# Patient Record
Sex: FEMALE | Race: WHITE | NOT HISPANIC OR LATINO | Employment: OTHER | RURAL
[De-identification: names, ages, dates, MRNs, and addresses within clinical notes are randomized per-mention and may not be internally consistent; named-entity substitution may affect disease eponyms.]

---

## 2020-11-23 ENCOUNTER — HISTORICAL (OUTPATIENT)
Dept: ADMINISTRATIVE | Facility: HOSPITAL | Age: 50
End: 2020-11-23

## 2021-04-17 ENCOUNTER — HOSPITAL ENCOUNTER (EMERGENCY)
Facility: HOSPITAL | Age: 51
Discharge: HOME | End: 2021-04-17
Payer: MEDICARE

## 2021-04-17 VITALS
RESPIRATION RATE: 16 BRPM | TEMPERATURE: 98 F | OXYGEN SATURATION: 96 % | BODY MASS INDEX: 27.31 KG/M2 | HEART RATE: 90 BPM | HEIGHT: 64 IN | SYSTOLIC BLOOD PRESSURE: 129 MMHG | WEIGHT: 160 LBS | DIASTOLIC BLOOD PRESSURE: 90 MMHG

## 2021-04-17 DIAGNOSIS — R10.9 ABDOMINAL PAIN, UNSPECIFIED ABDOMINAL LOCATION: ICD-10-CM

## 2021-04-17 DIAGNOSIS — R25.1 TREMOR: ICD-10-CM

## 2021-04-17 DIAGNOSIS — R63.0 DECREASED APPETITE: ICD-10-CM

## 2021-04-17 DIAGNOSIS — K59.00 CONSTIPATION, UNSPECIFIED CONSTIPATION TYPE: Primary | ICD-10-CM

## 2021-04-17 DIAGNOSIS — F20.9 SCHIZOPHRENIA: ICD-10-CM

## 2021-04-17 DIAGNOSIS — K59.00 CONSTIPATION: ICD-10-CM

## 2021-04-17 LAB
ALBUMIN SERPL BCP-MCNC: 4 G/DL (ref 3.5–5)
ALBUMIN/GLOB SERPL: 1.1 {RATIO}
ALP SERPL-CCNC: 109 U/L (ref 41–108)
ALT SERPL W P-5'-P-CCNC: 32 U/L (ref 13–56)
ANION GAP SERPL CALCULATED.3IONS-SCNC: 13 MMOL/L (ref 7–16)
AST SERPL W P-5'-P-CCNC: 26 U/L (ref 15–37)
BASOPHILS # BLD AUTO: 0.02 K/UL (ref 0–0.2)
BASOPHILS NFR BLD AUTO: 0.5 % (ref 0–1)
BILIRUB SERPL-MCNC: 0.3 MG/DL (ref 0–1.2)
BILIRUB UR QL STRIP: NEGATIVE
BUN SERPL-MCNC: 7 MG/DL (ref 7–18)
BUN/CREAT SERPL: 8 (ref 6–20)
CALCIUM SERPL-MCNC: 9.1 MG/DL (ref 8.5–10.1)
CHLORIDE SERPL-SCNC: 107 MMOL/L (ref 98–107)
CLARITY UR: CLEAR
CO2 SERPL-SCNC: 29 MMOL/L (ref 21–32)
COLOR UR: ABNORMAL
CREAT SERPL-MCNC: 0.92 MG/DL (ref 0.55–1.02)
DIFFERENTIAL METHOD BLD: ABNORMAL
EOSINOPHIL # BLD AUTO: 0.12 K/UL (ref 0–0.5)
EOSINOPHIL NFR BLD AUTO: 3 % (ref 1–4)
ERYTHROCYTE [DISTWIDTH] IN BLOOD BY AUTOMATED COUNT: 13.2 % (ref 11.5–14.5)
GLOBULIN SER-MCNC: 3.6 G/DL (ref 2–4)
GLUCOSE SERPL-MCNC: 91 MG/DL (ref 74–106)
GLUCOSE UR STRIP-MCNC: NEGATIVE MG/DL
HCT VFR BLD AUTO: 46.8 % (ref 38–47)
HGB BLD-MCNC: 15.3 G/DL (ref 12–16)
IMM GRANULOCYTES # BLD AUTO: 0.01 K/UL (ref 0–0.04)
IMM GRANULOCYTES NFR BLD: 0.2 % (ref 0–0.4)
KETONES UR STRIP-SCNC: 15 MG/DL
LEUKOCYTE ESTERASE UR QL STRIP: NEGATIVE
LIPASE SERPL-CCNC: 84 U/L (ref 73–393)
LYMPHOCYTES # BLD AUTO: 0.85 K/UL (ref 1–4.8)
LYMPHOCYTES NFR BLD AUTO: 21 % (ref 27–41)
MCH RBC QN AUTO: 30.1 PG (ref 27–31)
MCHC RBC AUTO-ENTMCNC: 32.7 G/DL (ref 32–36)
MCV RBC AUTO: 92.1 FL (ref 80–96)
MONOCYTES # BLD AUTO: 0.61 K/UL (ref 0–0.8)
MONOCYTES NFR BLD AUTO: 15.1 % (ref 2–6)
MPC BLD CALC-MCNC: 10.1 FL (ref 9.4–12.4)
NEUTROPHILS # BLD AUTO: 2.44 K/UL (ref 1.8–7.7)
NEUTROPHILS NFR BLD AUTO: 60.2 % (ref 53–65)
NITRITE UR QL STRIP: NEGATIVE
NRBC # BLD AUTO: 0 X10E3/UL
NRBC, AUTO (.00): 0 %
PH UR STRIP: 6 PH UNITS
PLATELET # BLD AUTO: 246 K/UL (ref 150–400)
POTASSIUM SERPL-SCNC: 3.8 MMOL/L (ref 3.5–5.1)
PROT SERPL-MCNC: 7.6 G/DL (ref 6.4–8.2)
PROT UR QL STRIP: NEGATIVE
RBC # BLD AUTO: 5.08 M/UL (ref 4.2–5.4)
RBC # UR STRIP: NEGATIVE /UL
SODIUM SERPL-SCNC: 145 MMOL/L (ref 136–145)
SP GR UR STRIP: 1.02
UROBILINOGEN UR STRIP-ACNC: 0.2 MG/DL
WBC # BLD AUTO: 4.05 K/UL (ref 4.5–11)

## 2021-04-17 PROCEDURE — 99283 EMERGENCY DEPT VISIT LOW MDM: CPT | Mod: ,,, | Performed by: NURSE PRACTITIONER

## 2021-04-17 PROCEDURE — 99284 EMERGENCY DEPT VISIT MOD MDM: CPT | Mod: 25

## 2021-04-17 PROCEDURE — 81003 URINALYSIS AUTO W/O SCOPE: CPT | Performed by: NURSE PRACTITIONER

## 2021-04-17 PROCEDURE — 80053 COMPREHEN METABOLIC PANEL: CPT | Performed by: NURSE PRACTITIONER

## 2021-04-17 PROCEDURE — 99283 PR EMERGENCY DEPT VISIT,LEVEL III: ICD-10-PCS | Mod: ,,, | Performed by: NURSE PRACTITIONER

## 2021-04-17 PROCEDURE — 85025 COMPLETE CBC W/AUTO DIFF WBC: CPT | Performed by: NURSE PRACTITIONER

## 2021-04-17 PROCEDURE — 83690 ASSAY OF LIPASE: CPT | Performed by: NURSE PRACTITIONER

## 2021-04-17 PROCEDURE — 36415 COLL VENOUS BLD VENIPUNCTURE: CPT | Performed by: NURSE PRACTITIONER

## 2021-04-17 RX ORDER — TRAZODONE HYDROCHLORIDE 150 MG/1
150 TABLET ORAL NIGHTLY
COMMUNITY
End: 2021-06-15

## 2021-04-17 RX ORDER — HYDROXYZINE HYDROCHLORIDE 25 MG/1
25 TABLET, FILM COATED ORAL 3 TIMES DAILY
COMMUNITY
End: 2021-06-15

## 2021-04-17 RX ORDER — NITROFURANTOIN (MACROCRYSTALS) 100 MG/1
100 CAPSULE ORAL EVERY 6 HOURS
COMMUNITY
End: 2021-06-15

## 2021-04-17 RX ORDER — BENZTROPINE MESYLATE 1 MG/1
1 TABLET ORAL 2 TIMES DAILY
COMMUNITY
End: 2021-06-15

## 2021-04-17 RX ORDER — HALOPERIDOL DECANOATE 100 MG/ML
INJECTION INTRAMUSCULAR
COMMUNITY
End: 2021-06-15

## 2021-04-17 RX ORDER — OLANZAPINE 5 MG/1
5 TABLET ORAL NIGHTLY
COMMUNITY
End: 2021-06-15

## 2021-04-17 RX ORDER — POLYETHYLENE GLYCOL 3350 17 G/17G
17 POWDER, FOR SOLUTION ORAL DAILY
Qty: 1 BOTTLE | Refills: 0 | Status: SHIPPED | OUTPATIENT
Start: 2021-04-17 | End: 2021-06-15

## 2021-04-17 RX ORDER — DULOXETIN HYDROCHLORIDE 60 MG/1
60 CAPSULE, DELAYED RELEASE ORAL DAILY
COMMUNITY
End: 2021-06-15

## 2021-04-26 ENCOUNTER — HISTORICAL (OUTPATIENT)
Dept: ADMINISTRATIVE | Facility: HOSPITAL | Age: 51
End: 2021-04-26

## 2021-06-27 ENCOUNTER — HOSPITAL ENCOUNTER (EMERGENCY)
Facility: HOSPITAL | Age: 51
Discharge: HOME | End: 2021-06-27
Payer: MEDICARE

## 2021-06-27 VITALS
HEART RATE: 87 BPM | RESPIRATION RATE: 20 BRPM | DIASTOLIC BLOOD PRESSURE: 95 MMHG | OXYGEN SATURATION: 94 % | WEIGHT: 160 LBS | BODY MASS INDEX: 28.35 KG/M2 | SYSTOLIC BLOOD PRESSURE: 134 MMHG | TEMPERATURE: 99 F | HEIGHT: 63 IN

## 2021-06-27 DIAGNOSIS — R33.9 URINARY RETENTION: ICD-10-CM

## 2021-06-27 DIAGNOSIS — F20.9 SCHIZOPHRENIA: ICD-10-CM

## 2021-06-27 DIAGNOSIS — R25.1 TREMOR: ICD-10-CM

## 2021-06-27 DIAGNOSIS — F20.9 SCHIZOPHRENIA, UNSPECIFIED TYPE: Primary | ICD-10-CM

## 2021-06-27 DIAGNOSIS — K59.00 CONSTIPATION: ICD-10-CM

## 2021-06-27 LAB
AMPHET UR QL SCN: NEGATIVE
ANION GAP SERPL CALCULATED.3IONS-SCNC: 17 MMOL/L (ref 7–16)
BARBITURATES UR QL SCN: NEGATIVE
BASOPHILS # BLD AUTO: 0.03 K/UL (ref 0–0.2)
BASOPHILS NFR BLD AUTO: 0.4 % (ref 0–1)
BENZODIAZ METAB UR QL SCN: NEGATIVE
BILIRUB UR QL STRIP: NEGATIVE
BUN SERPL-MCNC: 10 MG/DL (ref 7–18)
BUN/CREAT SERPL: 12 (ref 6–20)
CALCIUM SERPL-MCNC: 9.3 MG/DL (ref 8.5–10.1)
CANNABINOIDS UR QL SCN: NEGATIVE
CHLORIDE SERPL-SCNC: 105 MMOL/L (ref 98–107)
CLARITY UR: CLEAR
CO2 SERPL-SCNC: 25 MMOL/L (ref 21–32)
COCAINE UR QL SCN: NEGATIVE
COLOR UR: ABNORMAL
CREAT SERPL-MCNC: 0.81 MG/DL (ref 0.55–1.02)
DIFFERENTIAL METHOD BLD: ABNORMAL
EOSINOPHIL # BLD AUTO: 0.07 K/UL (ref 0–0.5)
EOSINOPHIL NFR BLD AUTO: 1 % (ref 1–4)
ERYTHROCYTE [DISTWIDTH] IN BLOOD BY AUTOMATED COUNT: 14.3 % (ref 11.5–14.5)
ETHANOL, BLOOD (CATEGORY): NOT DETECTED
GLUCOSE SERPL-MCNC: 210 MG/DL (ref 74–106)
GLUCOSE UR STRIP-MCNC: 250 MG/DL
HCT VFR BLD AUTO: 42.7 % (ref 38–47)
HGB BLD-MCNC: 14.1 G/DL (ref 12–16)
IMM GRANULOCYTES # BLD AUTO: 0.05 K/UL (ref 0–0.04)
IMM GRANULOCYTES NFR BLD: 0.7 % (ref 0–0.4)
KETONES UR STRIP-SCNC: NEGATIVE MG/DL
LEUKOCYTE ESTERASE UR QL STRIP: NEGATIVE
LYMPHOCYTES # BLD AUTO: 1.08 K/UL (ref 1–4.8)
LYMPHOCYTES NFR BLD AUTO: 15.8 % (ref 27–41)
MCH RBC QN AUTO: 30.2 PG (ref 27–31)
MCHC RBC AUTO-ENTMCNC: 33 G/DL (ref 32–36)
MCV RBC AUTO: 91.4 FL (ref 80–96)
MONOCYTES # BLD AUTO: 0.45 K/UL (ref 0–0.8)
MONOCYTES NFR BLD AUTO: 6.6 % (ref 2–6)
MPC BLD CALC-MCNC: 10.5 FL (ref 9.4–12.4)
NEUTROPHILS # BLD AUTO: 5.16 K/UL (ref 1.8–7.7)
NEUTROPHILS NFR BLD AUTO: 75.5 % (ref 53–65)
NITRITE UR QL STRIP: NEGATIVE
NRBC # BLD AUTO: 0 X10E3/UL
NRBC, AUTO (.00): 0 %
OPIATES UR QL SCN: NEGATIVE
PCP UR QL SCN: NEGATIVE
PH UR STRIP: 7 PH UNITS
PLATELET # BLD AUTO: 230 K/UL (ref 150–400)
POTASSIUM SERPL-SCNC: 3.5 MMOL/L (ref 3.5–5.1)
PROT UR QL STRIP: NEGATIVE
RBC # BLD AUTO: 4.67 M/UL (ref 4.2–5.4)
RBC # UR STRIP: NEGATIVE /UL
SODIUM SERPL-SCNC: 143 MMOL/L (ref 136–145)
SP GR UR STRIP: 1.01
UROBILINOGEN UR STRIP-ACNC: 0.2 MG/DL
WBC # BLD AUTO: 6.84 K/UL (ref 4.5–11)

## 2021-06-27 PROCEDURE — 99284 EMERGENCY DEPT VISIT MOD MDM: CPT | Mod: 25

## 2021-06-27 PROCEDURE — 80048 BASIC METABOLIC PNL TOTAL CA: CPT | Performed by: NURSE PRACTITIONER

## 2021-06-27 PROCEDURE — 80307 DRUG TEST PRSMV CHEM ANLYZR: CPT | Mod: 59 | Performed by: NURSE PRACTITIONER

## 2021-06-27 PROCEDURE — 96374 THER/PROPH/DIAG INJ IV PUSH: CPT

## 2021-06-27 PROCEDURE — 99283 PR EMERGENCY DEPT VISIT,LEVEL III: ICD-10-PCS | Mod: ,,, | Performed by: NURSE PRACTITIONER

## 2021-06-27 PROCEDURE — 81003 URINALYSIS AUTO W/O SCOPE: CPT | Mod: 59 | Performed by: NURSE PRACTITIONER

## 2021-06-27 PROCEDURE — 63600175 PHARM REV CODE 636 W HCPCS: Performed by: NURSE PRACTITIONER

## 2021-06-27 PROCEDURE — 99283 EMERGENCY DEPT VISIT LOW MDM: CPT | Mod: ,,, | Performed by: NURSE PRACTITIONER

## 2021-06-27 PROCEDURE — 80307 DRUG TEST PRSMV CHEM ANLYZR: CPT | Performed by: NURSE PRACTITIONER

## 2021-06-27 PROCEDURE — 36415 COLL VENOUS BLD VENIPUNCTURE: CPT | Performed by: NURSE PRACTITIONER

## 2021-06-27 PROCEDURE — 85025 COMPLETE CBC W/AUTO DIFF WBC: CPT | Performed by: NURSE PRACTITIONER

## 2021-06-27 RX ORDER — LORAZEPAM 2 MG/ML
1 INJECTION INTRAMUSCULAR
Status: COMPLETED | OUTPATIENT
Start: 2021-06-27 | End: 2021-06-27

## 2021-06-27 RX ADMIN — LORAZEPAM 1 MG: 2 INJECTION INTRAMUSCULAR; INTRAVENOUS at 02:06

## 2021-07-12 PROBLEM — R33.9 URINARY RETENTION: Status: ACTIVE | Noted: 2021-07-12

## 2021-08-03 ENCOUNTER — HOSPITAL ENCOUNTER (OUTPATIENT)
Dept: RADIOLOGY | Facility: HOSPITAL | Age: 51
Discharge: HOME | End: 2021-08-03
Payer: MEDICARE

## 2021-08-03 DIAGNOSIS — E04.9 ENLARGEMENT OF THYROID: ICD-10-CM

## 2021-08-03 PROCEDURE — 76536 US EXAM OF HEAD AND NECK: CPT | Mod: TC

## 2021-09-16 ENCOUNTER — OFFICE VISIT (OUTPATIENT)
Dept: FAMILY MEDICINE | Facility: CLINIC | Age: 51
End: 2021-09-16
Payer: MEDICARE

## 2021-09-16 ENCOUNTER — OFFICE VISIT (OUTPATIENT)
Dept: OBSTETRICS AND GYNECOLOGY | Facility: CLINIC | Age: 51
End: 2021-09-16
Payer: MEDICARE

## 2021-09-16 VITALS
WEIGHT: 152 LBS | BODY MASS INDEX: 28.7 KG/M2 | DIASTOLIC BLOOD PRESSURE: 73 MMHG | TEMPERATURE: 97 F | RESPIRATION RATE: 18 BRPM | HEIGHT: 61 IN | SYSTOLIC BLOOD PRESSURE: 105 MMHG | HEART RATE: 85 BPM | OXYGEN SATURATION: 96 %

## 2021-09-16 VITALS
SYSTOLIC BLOOD PRESSURE: 122 MMHG | WEIGHT: 150.38 LBS | HEIGHT: 61 IN | BODY MASS INDEX: 28.39 KG/M2 | DIASTOLIC BLOOD PRESSURE: 64 MMHG

## 2021-09-16 DIAGNOSIS — G89.29 CHRONIC RIGHT SHOULDER PAIN: Chronic | ICD-10-CM

## 2021-09-16 DIAGNOSIS — R25.1 TREMOR: Chronic | ICD-10-CM

## 2021-09-16 DIAGNOSIS — R30.0 DYSURIA: Primary | ICD-10-CM

## 2021-09-16 DIAGNOSIS — K59.00 CONSTIPATION, UNSPECIFIED CONSTIPATION TYPE: Chronic | ICD-10-CM

## 2021-09-16 DIAGNOSIS — R13.10 DYSPHAGIA, UNSPECIFIED TYPE: Primary | Chronic | ICD-10-CM

## 2021-09-16 DIAGNOSIS — Z12.11 COLON CANCER SCREENING: ICD-10-CM

## 2021-09-16 DIAGNOSIS — M25.511 CHRONIC RIGHT SHOULDER PAIN: Chronic | ICD-10-CM

## 2021-09-16 DIAGNOSIS — F20.9 SCHIZOPHRENIA, UNSPECIFIED TYPE: Chronic | ICD-10-CM

## 2021-09-16 LAB
BILIRUB UR QL STRIP: NEGATIVE
CLARITY UR: CLEAR
COLOR UR: YELLOW
GLUCOSE UR STRIP-MCNC: NEGATIVE MG/DL
KETONES UR STRIP-SCNC: NEGATIVE MG/DL
LEUKOCYTE ESTERASE UR QL STRIP: NEGATIVE
NITRITE UR QL STRIP: NEGATIVE
PH UR STRIP: 5 PH UNITS
PROT UR QL STRIP: NEGATIVE
RBC # UR STRIP: NEGATIVE /UL
RBC #/AREA URNS HPF: NORMAL /HPF
SP GR UR STRIP: 1.02
SQUAMOUS #/AREA URNS LPF: NORMAL /LPF
UROBILINOGEN UR STRIP-ACNC: 0.2 MG/DL
WBC #/AREA URNS HPF: NORMAL /HPF

## 2021-09-16 PROCEDURE — 99213 OFFICE O/P EST LOW 20 MIN: CPT | Mod: PBBFAC | Performed by: OBSTETRICS & GYNECOLOGY

## 2021-09-16 PROCEDURE — 81001 URINALYSIS: ICD-10-PCS | Mod: ,,, | Performed by: CLINICAL MEDICAL LABORATORY

## 2021-09-16 PROCEDURE — 99204 PR OFFICE/OUTPT VISIT, NEW, LEVL IV, 45-59 MIN: ICD-10-PCS | Mod: ,,, | Performed by: FAMILY MEDICINE

## 2021-09-16 PROCEDURE — 99203 PR OFFICE/OUTPT VISIT, NEW, LEVL III, 30-44 MIN: ICD-10-PCS | Mod: S$PBB,,, | Performed by: OBSTETRICS & GYNECOLOGY

## 2021-09-16 PROCEDURE — 99203 OFFICE O/P NEW LOW 30 MIN: CPT | Mod: S$PBB,,, | Performed by: OBSTETRICS & GYNECOLOGY

## 2021-09-16 PROCEDURE — 87086 URINE CULTURE/COLONY COUNT: CPT | Mod: ,,, | Performed by: CLINICAL MEDICAL LABORATORY

## 2021-09-16 PROCEDURE — 99204 OFFICE O/P NEW MOD 45 MIN: CPT | Mod: ,,, | Performed by: FAMILY MEDICINE

## 2021-09-16 PROCEDURE — 87086 CULTURE, URINE: ICD-10-PCS | Mod: ,,, | Performed by: CLINICAL MEDICAL LABORATORY

## 2021-09-16 PROCEDURE — 81001 URINALYSIS AUTO W/SCOPE: CPT | Mod: ,,, | Performed by: CLINICAL MEDICAL LABORATORY

## 2021-09-16 RX ORDER — QUETIAPINE 400 MG/1
400 TABLET, FILM COATED, EXTENDED RELEASE ORAL DAILY
COMMUNITY
End: 2022-07-19 | Stop reason: SDUPTHER

## 2021-09-16 RX ORDER — QUETIAPINE FUMARATE 200 MG/1
1 TABLET, FILM COATED ORAL NIGHTLY
COMMUNITY
Start: 2021-08-16 | End: 2021-11-04

## 2021-09-16 RX ORDER — QUETIAPINE FUMARATE 100 MG/1
1 TABLET, FILM COATED ORAL NIGHTLY
COMMUNITY
Start: 2021-08-10 | End: 2021-11-04

## 2021-09-16 RX ORDER — QUETIAPINE FUMARATE 300 MG/1
300 TABLET, FILM COATED ORAL NIGHTLY
COMMUNITY
Start: 2021-08-27 | End: 2021-11-04

## 2021-09-16 RX ORDER — DOCUSATE SODIUM 100 MG/1
200 CAPSULE ORAL DAILY
COMMUNITY
Start: 2021-08-24

## 2021-09-16 RX ORDER — BENZTROPINE MESYLATE 2 MG/1
1 TABLET ORAL 2 TIMES DAILY
COMMUNITY
End: 2021-11-04

## 2021-09-16 RX ORDER — LEVOTHYROXINE SODIUM 100 UG/1
100 TABLET ORAL
COMMUNITY
End: 2022-02-11 | Stop reason: SDUPTHER

## 2021-09-16 RX ORDER — HALOPERIDOL DECANOATE 100 MG/ML
100 INJECTION INTRAMUSCULAR
COMMUNITY
Start: 2021-09-14

## 2021-09-16 RX ORDER — TRAZODONE HYDROCHLORIDE 150 MG/1
1 TABLET ORAL NIGHTLY
COMMUNITY
Start: 2021-08-24 | End: 2021-11-04 | Stop reason: ALTCHOICE

## 2021-09-16 RX ORDER — ARIPIPRAZOLE 10 MG/1
10 TABLET ORAL EVERY MORNING
COMMUNITY
Start: 2021-08-27

## 2021-09-18 LAB — UA COMPLETE W REFLEX CULTURE PNL UR: NO GROWTH

## 2021-09-20 ENCOUNTER — OFFICE VISIT (OUTPATIENT)
Dept: FAMILY MEDICINE | Facility: CLINIC | Age: 51
End: 2021-09-20
Payer: MEDICARE

## 2021-09-20 VITALS
RESPIRATION RATE: 16 BRPM | TEMPERATURE: 98 F | HEIGHT: 63 IN | HEART RATE: 95 BPM | BODY MASS INDEX: 26.72 KG/M2 | DIASTOLIC BLOOD PRESSURE: 79 MMHG | OXYGEN SATURATION: 96 % | WEIGHT: 150.81 LBS | SYSTOLIC BLOOD PRESSURE: 109 MMHG

## 2021-09-20 DIAGNOSIS — M25.511 CHRONIC RIGHT SHOULDER PAIN: Chronic | ICD-10-CM

## 2021-09-20 DIAGNOSIS — D17.21 LIPOMA OF RIGHT UPPER EXTREMITY: Primary | Chronic | ICD-10-CM

## 2021-09-20 DIAGNOSIS — G89.29 CHRONIC RIGHT SHOULDER PAIN: Chronic | ICD-10-CM

## 2021-09-20 PROBLEM — F20.9 SCHIZOPHRENIA: Chronic | Status: ACTIVE | Noted: 2021-04-17

## 2021-09-20 PROBLEM — R13.10 DYSPHAGIA: Chronic | Status: ACTIVE | Noted: 2021-09-20

## 2021-09-20 PROBLEM — R25.1 TREMOR: Chronic | Status: ACTIVE | Noted: 2021-04-17

## 2021-09-20 PROBLEM — K59.00 CONSTIPATION: Chronic | Status: ACTIVE | Noted: 2021-04-17

## 2021-09-20 PROCEDURE — 99213 OFFICE O/P EST LOW 20 MIN: CPT | Mod: ,,, | Performed by: FAMILY MEDICINE

## 2021-09-20 PROCEDURE — 99213 PR OFFICE/OUTPT VISIT, EST, LEVL III, 20-29 MIN: ICD-10-PCS | Mod: ,,, | Performed by: FAMILY MEDICINE

## 2021-09-20 RX ORDER — NAPROXEN 500 MG/1
500 TABLET ORAL 2 TIMES DAILY
Qty: 60 TABLET | Refills: 1 | Status: SHIPPED | OUTPATIENT
Start: 2021-09-20 | End: 2021-11-04

## 2021-09-22 ENCOUNTER — TELEPHONE (OUTPATIENT)
Dept: FAMILY MEDICINE | Facility: CLINIC | Age: 51
End: 2021-09-22

## 2021-11-04 ENCOUNTER — OFFICE VISIT (OUTPATIENT)
Dept: FAMILY MEDICINE | Facility: CLINIC | Age: 51
End: 2021-11-04
Payer: MEDICARE

## 2021-11-04 VITALS
HEART RATE: 127 BPM | BODY MASS INDEX: 26.22 KG/M2 | RESPIRATION RATE: 18 BRPM | HEIGHT: 63 IN | WEIGHT: 148 LBS | DIASTOLIC BLOOD PRESSURE: 88 MMHG | TEMPERATURE: 97 F | SYSTOLIC BLOOD PRESSURE: 119 MMHG | OXYGEN SATURATION: 95 %

## 2021-11-04 DIAGNOSIS — F20.81 SCHIZOPHRENIFORM DISORDER: Chronic | ICD-10-CM

## 2021-11-04 PROCEDURE — 99214 OFFICE O/P EST MOD 30 MIN: CPT | Mod: ,,, | Performed by: FAMILY MEDICINE

## 2021-11-04 PROCEDURE — 99214 PR OFFICE/OUTPT VISIT, EST, LEVL IV, 30-39 MIN: ICD-10-PCS | Mod: ,,, | Performed by: FAMILY MEDICINE

## 2021-11-04 RX ORDER — MIRTAZAPINE 15 MG/1
7.5 TABLET, FILM COATED ORAL NIGHTLY
COMMUNITY

## 2021-11-04 RX ORDER — HYDROXYZINE PAMOATE 50 MG/1
50 CAPSULE ORAL NIGHTLY PRN
COMMUNITY

## 2021-11-30 ENCOUNTER — HOSPITAL ENCOUNTER (OUTPATIENT)
Dept: RADIOLOGY | Facility: HOSPITAL | Age: 51
Discharge: HOME | End: 2021-11-30
Payer: MEDICARE

## 2021-11-30 VITALS — BODY MASS INDEX: 26.22 KG/M2 | HEIGHT: 63 IN | WEIGHT: 148 LBS

## 2021-11-30 DIAGNOSIS — R13.10 DYSPHAGIA: Primary | ICD-10-CM

## 2021-11-30 DIAGNOSIS — Z12.11 SCREENING FOR MALIGNANT NEOPLASM OF COLON: Primary | ICD-10-CM

## 2021-11-30 DIAGNOSIS — Z12.31 VISIT FOR SCREENING MAMMOGRAM: ICD-10-CM

## 2021-11-30 DIAGNOSIS — Z01.818 PRE-OP TESTING: ICD-10-CM

## 2021-11-30 PROCEDURE — 77067 SCR MAMMO BI INCL CAD: CPT | Mod: TC

## 2021-12-20 ENCOUNTER — HOSPITAL ENCOUNTER (OUTPATIENT)
Dept: GASTROENTEROLOGY | Facility: HOSPITAL | Age: 51
Discharge: HOME | End: 2021-12-20
Attending: INTERNAL MEDICINE
Payer: MEDICARE

## 2021-12-20 ENCOUNTER — ANESTHESIA (OUTPATIENT)
Dept: GASTROENTEROLOGY | Facility: HOSPITAL | Age: 51
End: 2021-12-20
Payer: MEDICARE

## 2021-12-20 ENCOUNTER — ANESTHESIA EVENT (OUTPATIENT)
Dept: GASTROENTEROLOGY | Facility: HOSPITAL | Age: 51
End: 2021-12-20
Payer: MEDICARE

## 2021-12-20 VITALS
HEART RATE: 63 BPM | TEMPERATURE: 99 F | SYSTOLIC BLOOD PRESSURE: 123 MMHG | RESPIRATION RATE: 14 BRPM | DIASTOLIC BLOOD PRESSURE: 66 MMHG | OXYGEN SATURATION: 99 %

## 2021-12-20 DIAGNOSIS — K29.00 ACUTE SUPERFICIAL GASTRITIS WITHOUT HEMORRHAGE: ICD-10-CM

## 2021-12-20 DIAGNOSIS — R13.19 ESOPHAGEAL DYSPHAGIA: ICD-10-CM

## 2021-12-20 DIAGNOSIS — R13.10 DYSPHAGIA: ICD-10-CM

## 2021-12-20 PROCEDURE — D9220A PRA ANESTHESIA: Mod: ,,, | Performed by: NURSE ANESTHETIST, CERTIFIED REGISTERED

## 2021-12-20 PROCEDURE — 88305 TISSUE EXAM BY PATHOLOGIST: CPT | Mod: SUR,59 | Performed by: INTERNAL MEDICINE

## 2021-12-20 PROCEDURE — D9220A PRA ANESTHESIA: ICD-10-PCS | Mod: ,,, | Performed by: NURSE ANESTHETIST, CERTIFIED REGISTERED

## 2021-12-20 PROCEDURE — 25000003 PHARM REV CODE 250: Performed by: NURSE ANESTHETIST, CERTIFIED REGISTERED

## 2021-12-20 PROCEDURE — 88305 SURGICAL PATHOLOGY: ICD-10-PCS | Mod: 26,,, | Performed by: PATHOLOGY

## 2021-12-20 PROCEDURE — 27201423 OPTIME MED/SURG SUP & DEVICES STERILE SUPPLY

## 2021-12-20 PROCEDURE — 63600175 PHARM REV CODE 636 W HCPCS: Performed by: NURSE ANESTHETIST, CERTIFIED REGISTERED

## 2021-12-20 PROCEDURE — 43239 EGD BIOPSY SINGLE/MULTIPLE: CPT

## 2021-12-20 PROCEDURE — 43450 DILATE ESOPHAGUS 1/MULT PASS: CPT

## 2021-12-20 PROCEDURE — 88305 TISSUE EXAM BY PATHOLOGIST: CPT | Mod: 26,,, | Performed by: PATHOLOGY

## 2021-12-20 PROCEDURE — 88342 IMHCHEM/IMCYTCHM 1ST ANTB: CPT | Mod: 26,,, | Performed by: PATHOLOGY

## 2021-12-20 PROCEDURE — 88342 SURGICAL PATHOLOGY: ICD-10-PCS | Mod: 26,,, | Performed by: PATHOLOGY

## 2021-12-20 PROCEDURE — C1889 IMPLANT/INSERT DEVICE, NOC: HCPCS

## 2021-12-20 PROCEDURE — 37000008 HC ANESTHESIA 1ST 15 MINUTES

## 2021-12-20 RX ORDER — SODIUM CHLORIDE 0.9 % (FLUSH) 0.9 %
10 SYRINGE (ML) INJECTION
Status: DISCONTINUED | OUTPATIENT
Start: 2021-12-20 | End: 2021-12-21 | Stop reason: HOSPADM

## 2021-12-20 RX ORDER — FENTANYL CITRATE 50 UG/ML
INJECTION, SOLUTION INTRAMUSCULAR; INTRAVENOUS
Status: DISCONTINUED | OUTPATIENT
Start: 2021-12-20 | End: 2021-12-20

## 2021-12-20 RX ADMIN — FENTANYL CITRATE 100 MCG: 50 INJECTION INTRAMUSCULAR; INTRAVENOUS at 01:12

## 2021-12-20 RX ADMIN — SODIUM CHLORIDE: 9 INJECTION, SOLUTION INTRAVENOUS at 01:12

## 2021-12-21 ENCOUNTER — TELEPHONE (OUTPATIENT)
Dept: GASTROENTEROLOGY | Facility: CLINIC | Age: 51
End: 2021-12-21
Payer: MEDICAID

## 2021-12-21 LAB
ESTROGEN SERPL-MCNC: NORMAL PG/ML
LAB AP GROSS DESCRIPTION: NORMAL
LAB AP LABORATORY NOTES: NORMAL
T3RU NFR SERPL: NORMAL %

## 2022-01-04 ENCOUNTER — TELEPHONE (OUTPATIENT)
Dept: GASTROENTEROLOGY | Facility: CLINIC | Age: 52
End: 2022-01-04
Payer: MEDICARE

## 2022-01-04 NOTE — TELEPHONE ENCOUNTER
Called EGD path results . Patient verbalized understanding.      ----- Message from Cindy Francis sent at 1/4/2022  1:24 PM CST -----  Re: bx results

## 2022-01-05 DIAGNOSIS — Z01.818 PRE-OP TESTING: ICD-10-CM

## 2022-01-10 ENCOUNTER — ANESTHESIA (OUTPATIENT)
Dept: GASTROENTEROLOGY | Facility: HOSPITAL | Age: 52
End: 2022-01-10
Payer: MEDICAID

## 2022-01-10 ENCOUNTER — HOSPITAL ENCOUNTER (OUTPATIENT)
Dept: GASTROENTEROLOGY | Facility: HOSPITAL | Age: 52
Discharge: HOME | End: 2022-01-10
Attending: INTERNAL MEDICINE
Payer: MEDICARE

## 2022-01-10 ENCOUNTER — ANESTHESIA EVENT (OUTPATIENT)
Dept: GASTROENTEROLOGY | Facility: HOSPITAL | Age: 52
End: 2022-01-10
Payer: MEDICARE

## 2022-01-10 VITALS
TEMPERATURE: 97 F | HEART RATE: 74 BPM | SYSTOLIC BLOOD PRESSURE: 119 MMHG | RESPIRATION RATE: 12 BRPM | OXYGEN SATURATION: 99 % | DIASTOLIC BLOOD PRESSURE: 68 MMHG

## 2022-01-10 DIAGNOSIS — D12.2 ADENOMATOUS POLYP OF ASCENDING COLON: ICD-10-CM

## 2022-01-10 DIAGNOSIS — Z12.11 SCREENING FOR MALIGNANT NEOPLASM OF COLON: ICD-10-CM

## 2022-01-10 DIAGNOSIS — K57.30 DIVERTICULA, COLON: ICD-10-CM

## 2022-01-10 DIAGNOSIS — D12.5 ADENOMATOUS POLYP OF SIGMOID COLON: ICD-10-CM

## 2022-01-10 DIAGNOSIS — D12.4 ADENOMATOUS POLYP OF DESCENDING COLON: ICD-10-CM

## 2022-01-10 PROCEDURE — 88305 TISSUE EXAM BY PATHOLOGIST: CPT | Mod: 26,,, | Performed by: PATHOLOGY

## 2022-01-10 PROCEDURE — 88305 TISSUE EXAM BY PATHOLOGIST: CPT | Mod: SUR | Performed by: INTERNAL MEDICINE

## 2022-01-10 PROCEDURE — D9220A PRA ANESTHESIA: ICD-10-PCS | Mod: PT,,, | Performed by: NURSE ANESTHETIST, CERTIFIED REGISTERED

## 2022-01-10 PROCEDURE — 45380 COLONOSCOPY AND BIOPSY: CPT | Mod: 33

## 2022-01-10 PROCEDURE — 37000008 HC ANESTHESIA 1ST 15 MINUTES

## 2022-01-10 PROCEDURE — 25000003 PHARM REV CODE 250: Performed by: NURSE ANESTHETIST, CERTIFIED REGISTERED

## 2022-01-10 PROCEDURE — 88305 SURGICAL PATHOLOGY: ICD-10-PCS | Mod: 26,XU,, | Performed by: PATHOLOGY

## 2022-01-10 PROCEDURE — C1889 IMPLANT/INSERT DEVICE, NOC: HCPCS

## 2022-01-10 PROCEDURE — 63600175 PHARM REV CODE 636 W HCPCS: Performed by: NURSE ANESTHETIST, CERTIFIED REGISTERED

## 2022-01-10 PROCEDURE — D9220A PRA ANESTHESIA: Mod: PT,,, | Performed by: NURSE ANESTHETIST, CERTIFIED REGISTERED

## 2022-01-10 PROCEDURE — 37000009 HC ANESTHESIA EA ADD 15 MINS

## 2022-01-10 PROCEDURE — 27201423 OPTIME MED/SURG SUP & DEVICES STERILE SUPPLY

## 2022-01-10 RX ORDER — SODIUM CHLORIDE 0.9 % (FLUSH) 0.9 %
10 SYRINGE (ML) INJECTION
Status: DISCONTINUED | OUTPATIENT
Start: 2022-01-10 | End: 2022-01-11 | Stop reason: HOSPADM

## 2022-01-10 RX ORDER — SODIUM CHLORIDE 9 MG/ML
INJECTION, SOLUTION INTRAVENOUS CONTINUOUS PRN
Status: DISCONTINUED | OUTPATIENT
Start: 2022-01-10 | End: 2022-01-10

## 2022-01-10 RX ORDER — LIDOCAINE HYDROCHLORIDE 20 MG/ML
INJECTION, SOLUTION EPIDURAL; INFILTRATION; INTRACAUDAL; PERINEURAL
Status: DISCONTINUED | OUTPATIENT
Start: 2022-01-10 | End: 2022-01-10

## 2022-01-10 RX ORDER — PROPOFOL 10 MG/ML
VIAL (ML) INTRAVENOUS
Status: DISCONTINUED | OUTPATIENT
Start: 2022-01-10 | End: 2022-01-10

## 2022-01-10 RX ADMIN — PROPOFOL 150 MG: 10 INJECTION, EMULSION INTRAVENOUS at 12:01

## 2022-01-10 RX ADMIN — SODIUM CHLORIDE: 9 INJECTION, SOLUTION INTRAVENOUS at 12:01

## 2022-01-10 RX ADMIN — PROPOFOL 100 MG: 10 INJECTION, EMULSION INTRAVENOUS at 12:01

## 2022-01-10 RX ADMIN — PROPOFOL 75 MG: 10 INJECTION, EMULSION INTRAVENOUS at 12:01

## 2022-01-10 RX ADMIN — LIDOCAINE HYDROCHLORIDE 75 MG: 20 INJECTION, SOLUTION EPIDURAL; INFILTRATION; INTRACAUDAL; PERINEURAL at 12:01

## 2022-01-10 NOTE — TRANSFER OF CARE
Anesthesia Transfer of Care Note    Patient: Shruthi Mcdermott    Procedure(s) Performed: colonoscopy  Patient location: Other: [facility]    Anesthesia Type: general    Transport from OR: Transported from OR on room air with adequate spontaneous ventilation. Continuous SpO2 monitoring in transport. Continuous ECG monitoring in transport. Continuos invasive BP monitoring in transport    Post pain: adequate analgesia    Post assessment: no apparent anesthetic complications    Post vital signs: stable    Level of consciousness: sedated and responds to stimulation    Nausea/Vomiting: no nausea/vomiting    Complications: none    Transfer of care protocol was followedComments: Good SV continue, NAD noted, VSS, RTRN      Last vitals:   Visit Vitals  BP (!) 107/57 (BP Location: Right arm, Patient Position: Lying)   Pulse 78   Temp 36.1 °C (97 °F)   Resp 18   SpO2 97%   Breastfeeding No

## 2022-01-10 NOTE — ANESTHESIA PREPROCEDURE EVALUATION
01/10/2022  Shruthi Mcdermott is a 51 y.o., female.    Anesthesia Evaluation    I have reviewed the Patient Summary Reports.    I have reviewed the Nursing Notes. I have reviewed the NPO Status.   I have reviewed the Medications.     Review of Systems  Anesthesia Hx:  No problems with previous Anesthesia    Social:  Non-Smoker, No Alcohol Use    Hematology/Oncology:  Hematology Normal   Oncology Normal     EENT/Dental:EENT/Dental Normal   Cardiovascular:  Cardiovascular Normal     Pulmonary:  Pulmonary Normal    Renal/:  Renal/ Normal     Hepatic/GI:  Hepatic/GI Normal    Musculoskeletal:  Musculoskeletal Normal    Neurological:  Neurology Normal    Endocrine:  Endocrine Normal    Dermatological:  Skin Normal    Psych:   Psychiatric History          Physical Exam  General:  Well nourished    Airway/Jaw/Neck:  Airway Findings: Mouth Opening: Normal Tongue: Normal  General Airway Assessment: Adult  Mallampati: II  TM Distance: Normal, at least 6 cm  Jaw/Neck Findings:     Eyes/Ears/Nose:  Eyes/Ears/Nose Findings:    Dental:  Dental Findings: In tact   Chest/Lungs:  Chest/Lungs Findings: Clear to auscultation, Normal Respiratory Rate     Heart/Vascular:  Heart Findings: Rate: Normal  Rhythm: Regular Rhythm  Sounds: Normal     Abdomen:  Abdomen Findings:  Normal, Soft, Nontender     Musculoskeletal:  Musculoskeletal Findings:     Mental Status:  Mental Status Findings:  Cooperative, Alert and Oriented         Anesthesia Plan  Type of Anesthesia, risks & benefits discussed:  Anesthesia Type:  general    Patient's Preference:   Plan Factors:          Intra-op Monitoring Plan: standard ASA monitors  Intra-op Monitoring Plan Comments:   Post Op Pain Control Plan: multimodal analgesia  Post Op Pain Control Plan Comments:     Induction:   IV  Beta Blocker:  Patient is not currently on a Beta-Blocker (No further  documentation required).       Informed Consent: Patient understands risks and agrees with Anesthesia plan.  Questions answered. Anesthesia consent signed with patient.  ASA Score: 2     Day of Surgery Review of History & Physical: I have interviewed and examined the patient. I have reviewed the patient's H&P dated:  There are no significant changes.          Ready For Surgery From Anesthesia Perspective.

## 2022-01-10 NOTE — ANESTHESIA POSTPROCEDURE EVALUATION
Anesthesia Post Evaluation    Patient: Shruthi Mcdermott    Procedure(s) Performed: colonscopy  Final Anesthesia Type: general      Patient location during evaluation: GI PACU  Patient participation: Yes- Able to Participate  Level of consciousness: awake and alert  Post-procedure vital signs: reviewed and stable  Pain management: adequate  Airway patency: patent    PONV status at discharge: No PONV  Anesthetic complications: no      Cardiovascular status: blood pressure returned to baseline and hemodynamically stable  Respiratory status: spontaneous ventilation  Hydration status: euvolemic  Follow-up not needed.  Comments: Pt voices appreciation for care          Vitals Value Taken Time   /68 01/10/22 1343   Temp 36.1 °C (97 °F) 01/10/22 1252   Pulse 73 01/10/22 1343   Resp 11 01/10/22 1343   SpO2 98 % 01/10/22 1343   Vitals shown include unvalidated device data.      Event Time   Out of Recovery 14:02:43         Pain/Vik Score: Vik Score: 10 (1/10/2022  1:05 PM)

## 2022-01-10 NOTE — H&P
[facility]  Gastroenterology  H&P    Patient Name: Shruthi Mcdermott  MRN: [id number]  Admission Date: 1/10/2022  Code Status: Full Code    Attending Provider: Rodríguez Ochoa MD  Primary Care Physician: Iglesia Andres MD  Principal Problem:<principal problem not specified>    Subjective:     History of Present Illness: Pt has no known family history of colon polyps and presents for her first colonoscopy.    Past Medical History:   Diagnosis Date    Acute superficial gastritis without hemorrhage 12/20/2021    Mental impairment     Schizophrenia     Urinary retention 7/12/2021       Past Surgical History:   Procedure Laterality Date    ANKLE SURGERY Right     CHOLECYSTECTOMY      DIAGNOSTIC LAPAROSCOPY      HYSTERECTOMY      OOPHORECTOMY      REDUCTION OF BOTH BREASTS      TOTAL REDUCTION MAMMOPLASTY         Review of patient's allergies indicates:  No Known Allergies  Family History    None       Tobacco Use    Smoking status: Never Smoker    Smokeless tobacco: Never Used   Substance and Sexual Activity    Alcohol use: Never    Drug use: Never    Sexual activity: Not Currently     Review of Systems   Respiratory: Negative.    Cardiovascular: Negative.    Gastrointestinal: Negative.      Objective:     Vital Signs (Most Recent):  Temp: 98 °F (36.7 °C) (01/10/22 1205)  Pulse: 79 (01/10/22 1205)  Resp: 14 (01/10/22 1205)  BP: 125/82 (01/10/22 1205)  SpO2: 99 % (01/10/22 1205) Vital Signs (24h Range):  Temp:  [98 °F (36.7 °C)] 98 °F (36.7 °C)  Pulse:  [79] 79  Resp:  [14] 14  SpO2:  [99 %] 99 %  BP: (125)/(82) 125/82        There is no height or weight on file to calculate BMI.    No intake or output data in the 24 hours ending 01/10/22 1227    Lines/Drains/Airways     Drain                 Urethral Catheter 06/27/21 1530 Straight-tip 16 Fr. 196 days          Peripheral Intravenous Line                 Peripheral IV - Single Lumen 01/10/22 1205 22 G Right Antecubital <1 day                 Physical Exam  Vitals reviewed.   Constitutional:       General: She is not in acute distress.     Appearance: Normal appearance. She is well-developed. She is not ill-appearing.   HENT:      Head: Normocephalic and atraumatic.      Nose: Nose normal.   Eyes:      Pupils: Pupils are equal, round, and reactive to light.   Cardiovascular:      Rate and Rhythm: Normal rate and regular rhythm.   Pulmonary:      Effort: Pulmonary effort is normal.      Breath sounds: Normal breath sounds. No wheezing.   Abdominal:      General: Abdomen is flat. Bowel sounds are normal. There is no distension.      Palpations: Abdomen is soft.      Tenderness: There is no abdominal tenderness. There is no guarding.   Skin:     General: Skin is warm and dry.      Coloration: Skin is not jaundiced.   Neurological:      Mental Status: She is alert.   Psychiatric:         Attention and Perception: Attention normal.         Mood and Affect: Affect normal.         Speech: Speech normal.         Behavior: Behavior is cooperative.      Comments: Pt was calm while speaking.         Significant Labs:  CBC: No results for input(s): WBC, HGB, HCT, PLT in the last 48 hours.  CMP: No results for input(s): GLU, CALCIUM, ALBUMIN, PROT, NA, K, CO2, CL, BUN, CREATININE, ALKPHOS, ALT, AST, BILITOT in the last 48 hours.    Significant Imaging:  Imaging results within the past 24 hours have been reviewed.    Assessment/Plan:     There are no hospital problems to display for this patient.        Imp: Average risk for colon neoplasm  Plan: colonoscopy    Rodríguez Ochoa MD  Gastroenterology  [facility]

## 2022-01-10 NOTE — DISCHARGE INSTRUCTIONS
Procedure Date  1/10/22     Impression  Overall Impression: Bowel prep was fair. Diverticula were noted in the sigmoid and descending colon. Three diminutive polyps were removed with biopsy from the sigmoid, descending and ascending colon.     Recommendation  Await pathology results  Repeat colonoscopy in 3 years; high fiber diet.    No driving today, no operating heavy machinery, no signing any legal documents until tomorrow.    Drink lots of fluids, resume regular diet.  Take your normal medications.

## 2022-01-12 ENCOUNTER — TELEPHONE (OUTPATIENT)
Dept: GASTROENTEROLOGY | Facility: CLINIC | Age: 52
End: 2022-01-12
Payer: MEDICARE

## 2022-01-12 NOTE — TELEPHONE ENCOUNTER
Attempted to call colonoscopy path results with no answer or voicemail.      ----- Message from Rodríguez Ochoa MD sent at 1/11/2022  1:24 PM CST -----  Place pt on list for colonoscopy in 3 years; polyps were ta.

## 2022-01-14 ENCOUNTER — TELEPHONE (OUTPATIENT)
Dept: GASTROENTEROLOGY | Facility: CLINIC | Age: 52
End: 2022-01-14
Payer: MEDICARE

## 2022-01-14 NOTE — TELEPHONE ENCOUNTER
Attempted to call patient with no answer or voicemail.    ----- Message from Rodríguez Ochoa MD sent at 1/11/2022  1:24 PM CST -----  Place pt on list for colonoscopy in 3 years; polyps were ta.

## 2022-01-21 ENCOUNTER — TELEPHONE (OUTPATIENT)
Dept: GASTROENTEROLOGY | Facility: CLINIC | Age: 52
End: 2022-01-21
Payer: MEDICARE

## 2022-01-21 NOTE — TELEPHONE ENCOUNTER
Attempted to call results with no answer or voicemail. Letter sent.      ----- Message from Rodríguez Ochoa MD sent at 1/11/2022  1:24 PM CST -----  Place pt on list for colonoscopy in 3 years; polyps were ta.

## 2022-01-27 ENCOUNTER — OFFICE VISIT (OUTPATIENT)
Dept: FAMILY MEDICINE | Facility: CLINIC | Age: 52
End: 2022-01-27
Payer: MEDICARE

## 2022-01-27 VITALS
RESPIRATION RATE: 18 BRPM | OXYGEN SATURATION: 98 % | WEIGHT: 160 LBS | TEMPERATURE: 97 F | HEIGHT: 61 IN | DIASTOLIC BLOOD PRESSURE: 79 MMHG | HEART RATE: 87 BPM | SYSTOLIC BLOOD PRESSURE: 112 MMHG | BODY MASS INDEX: 30.21 KG/M2

## 2022-01-27 DIAGNOSIS — J01.90 ACUTE NON-RECURRENT SINUSITIS, UNSPECIFIED LOCATION: Primary | ICD-10-CM

## 2022-01-27 DIAGNOSIS — Z20.822 COUGH WITH EXPOSURE TO COVID-19 VIRUS: ICD-10-CM

## 2022-01-27 DIAGNOSIS — R05.8 COUGH WITH EXPOSURE TO COVID-19 VIRUS: ICD-10-CM

## 2022-01-27 LAB
CTP QC/QA: YES
FLUAV AG NPH QL: NEGATIVE
FLUBV AG NPH QL: NEGATIVE
SARS-COV-2 AG RESP QL IA.RAPID: NEGATIVE

## 2022-01-27 PROCEDURE — 87428 POCT SARS-COV2 (COVID) WITH FLU ANTIGEN: ICD-10-PCS | Mod: QW,,, | Performed by: FAMILY MEDICINE

## 2022-01-27 PROCEDURE — 1159F PR MEDICATION LIST DOCUMENTED IN MEDICAL RECORD: ICD-10-PCS | Mod: ,,, | Performed by: FAMILY MEDICINE

## 2022-01-27 PROCEDURE — 3008F BODY MASS INDEX DOCD: CPT | Mod: ,,, | Performed by: FAMILY MEDICINE

## 2022-01-27 PROCEDURE — 3078F DIAST BP <80 MM HG: CPT | Mod: ,,, | Performed by: FAMILY MEDICINE

## 2022-01-27 PROCEDURE — 3074F SYST BP LT 130 MM HG: CPT | Mod: ,,, | Performed by: FAMILY MEDICINE

## 2022-01-27 PROCEDURE — 3074F PR MOST RECENT SYSTOLIC BLOOD PRESSURE < 130 MM HG: ICD-10-PCS | Mod: ,,, | Performed by: FAMILY MEDICINE

## 2022-01-27 PROCEDURE — 87428 SARSCOV & INF VIR A&B AG IA: CPT | Mod: QW,,, | Performed by: FAMILY MEDICINE

## 2022-01-27 PROCEDURE — 1159F MED LIST DOCD IN RCRD: CPT | Mod: ,,, | Performed by: FAMILY MEDICINE

## 2022-01-27 PROCEDURE — 99213 OFFICE O/P EST LOW 20 MIN: CPT | Mod: ,,, | Performed by: FAMILY MEDICINE

## 2022-01-27 PROCEDURE — 3078F PR MOST RECENT DIASTOLIC BLOOD PRESSURE < 80 MM HG: ICD-10-PCS | Mod: ,,, | Performed by: FAMILY MEDICINE

## 2022-01-27 PROCEDURE — 1160F PR REVIEW ALL MEDS BY PRESCRIBER/CLIN PHARMACIST DOCUMENTED: ICD-10-PCS | Mod: ,,, | Performed by: FAMILY MEDICINE

## 2022-01-27 PROCEDURE — 1160F RVW MEDS BY RX/DR IN RCRD: CPT | Mod: ,,, | Performed by: FAMILY MEDICINE

## 2022-01-27 PROCEDURE — 99213 PR OFFICE/OUTPT VISIT, EST, LEVL III, 20-29 MIN: ICD-10-PCS | Mod: ,,, | Performed by: FAMILY MEDICINE

## 2022-01-27 PROCEDURE — 3008F PR BODY MASS INDEX (BMI) DOCUMENTED: ICD-10-PCS | Mod: ,,, | Performed by: FAMILY MEDICINE

## 2022-01-27 RX ORDER — HYDROCODONE BITARTRATE AND ACETAMINOPHEN 7.5; 325 MG/1; MG/1
TABLET ORAL
COMMUNITY
Start: 2021-11-17 | End: 2022-07-19

## 2022-01-27 RX ORDER — AZITHROMYCIN 250 MG/1
TABLET, FILM COATED ORAL
Qty: 6 TABLET | Refills: 0 | Status: SHIPPED | OUTPATIENT
Start: 2022-01-27 | End: 2022-07-19

## 2022-01-27 RX ORDER — HYDROXYZINE PAMOATE 25 MG/1
25 CAPSULE ORAL EVERY 8 HOURS PRN
COMMUNITY
Start: 2021-09-24 | End: 2022-07-19 | Stop reason: SDUPTHER

## 2022-01-27 RX ORDER — TRAZODONE HYDROCHLORIDE 100 MG/1
100 TABLET ORAL NIGHTLY PRN
COMMUNITY
Start: 2022-01-18 | End: 2022-07-19

## 2022-01-27 RX ORDER — CHLORPHENIRAMINE MALEATE AND PHENYLEPHRINE HYDROCHLORIDE 4; 10 MG/1; MG/1
1 TABLET, COATED ORAL EVERY 8 HOURS PRN
Qty: 30 TABLET | Refills: 0 | Status: SHIPPED | OUTPATIENT
Start: 2022-01-27 | End: 2022-02-06

## 2022-01-27 RX ORDER — QUETIAPINE FUMARATE 400 MG/1
TABLET, FILM COATED ORAL
COMMUNITY
Start: 2022-01-03 | End: 2024-02-12

## 2022-01-27 NOTE — PROGRESS NOTES
New Clinic Note    Shruthi Mcdermott is a 51 y.o. female     CC:   Chief Complaint   Patient presents with    Fatigue    Fever    Nasal Congestion    Headache     Patient complains of 24 hours of sinus congestion, headache, and chills. Stated she did not take the Covid vaccine nor the flu vaccine.         Subjective    History of Present Illness HPI   Patient complains of nasal congestion, headache and chills that started last night. She has not taken anything for her symptoms.     Current Outpatient Medications:     ARIPiprazole (ABILIFY) 10 MG Tab, Take 10 mg by mouth every morning., Disp: , Rfl:     benztropine (COGENTIN) 1 MG tablet, Take 1 mg by mouth 2 (two) times daily., Disp: , Rfl:     COLACE 100 mg capsule, Take 200 mg by mouth once daily., Disp: , Rfl:     DULoxetine (CYMBALTA) 60 MG capsule, Take 60 mg by mouth once daily., Disp: , Rfl:     haloperidol decanoate (HALDOL DECANOATE) 100 mg/mL injection, , Disp: , Rfl:     HYDROcodone-acetaminophen (NORCO) 7.5-325 mg per tablet, , Disp: , Rfl:     hydrOXYzine pamoate (VISTARIL) 50 MG Cap, Take 50 mg by mouth nightly as needed., Disp: , Rfl:     levothyroxine (SYNTHROID) 100 MCG tablet, Take 100 mcg by mouth before breakfast., Disp: , Rfl:     mirtazapine (REMERON) 15 MG tablet, Take 7.5 mg by mouth every evening., Disp: , Rfl:     propranoloL (INDERAL) 10 MG tablet, Take 10 mg by mouth 2 (two) times daily., Disp: , Rfl:     QUEtiapine (SEROQUEL XR) 400 MG Tb24, Take 400 mg by mouth once daily., Disp: , Rfl:     QUEtiapine (SEROQUEL) 400 MG tablet, , Disp: , Rfl:     traZODone (DESYREL) 100 MG tablet, , Disp: , Rfl:     azithromycin (ZITHROMAX Z-CHALINO) 250 MG tablet, Take 2 tabs on Day 1. Take 1 tab on day 2-5, Disp: 6 tablet, Rfl: 0    chlorpheniramine-phenylephrine (ED A-HIST) 4-10 mg per tablet, Take 1 tablet by mouth every 8 (eight) hours as needed for Congestion., Disp: 30 tablet, Rfl: 0    hydrOXYzine pamoate (VISTARIL) 25 MG Cap, ,  "Disp: , Rfl:      Past Medical History:   Diagnosis Date    Acute superficial gastritis without hemorrhage 12/20/2021    Adenomatous polyp of ascending colon 1/10/2022    Adenomatous polyp of descending colon 1/10/2022    Adenomatous polyp of sigmoid colon 1/10/2022    Diverticula, colon 1/10/2022    Mental impairment     Schizophrenia     Screening for malignant neoplasm of colon 1/10/2022    Urinary retention 7/12/2021        Family History   Problem Relation Age of Onset    Heart disease Mother     Hypertension Father     Vision loss Neg Hx         Past Surgical History:   Procedure Laterality Date    ANKLE SURGERY Right     CHOLECYSTECTOMY      DIAGNOSTIC LAPAROSCOPY      HYSTERECTOMY      OOPHORECTOMY      REDUCTION OF BOTH BREASTS      TOTAL REDUCTION MAMMOPLASTY          Review of Systems   Constitutional: Positive for chills and fatigue. Negative for fever.   HENT: Positive for nasal congestion, postnasal drip, rhinorrhea and sinus pressure/congestion. Negative for ear pain.    Respiratory: Negative for cough and shortness of breath.    Cardiovascular: Negative for chest pain.   Gastrointestinal: Negative for abdominal pain, diarrhea, nausea and vomiting.   Genitourinary: Negative for dysuria.   Neurological: Positive for headaches.        /79 (BP Location: Right arm, Patient Position: Sitting, BP Method: Large (Automatic))   Pulse 87   Temp 96.6 °F (35.9 °C) (Oral)   Resp 18   Ht 5' 1" (1.549 m)   Wt 72.6 kg (160 lb)   SpO2 98%   BMI 30.23 kg/m²      Physical Exam  HENT:      Head: Normocephalic and atraumatic.   Pulmonary:      Effort: Pulmonary effort is normal.   Neurological:      Mental Status: She is alert and oriented to person, place, and time.   Psychiatric:         Mood and Affect: Mood normal.         Behavior: Behavior normal.          Assessment and Plan      ICD-10-CM ICD-9-CM   1. Acute non-recurrent sinusitis, unspecified location  J01.90 461.9   2. Cough with " exposure to COVID-19 virus  R05.8 786.2    Z20.822 V01.79        Problem List Items Addressed This Visit    None     Visit Diagnoses     Acute non-recurrent sinusitis, unspecified location    -  Primary    Cough with exposure to COVID-19 virus        Relevant Orders    POCT SARS-COV2 (COVID) with Flu Antigen (Completed)           Patient Instructions   1. Take medications as directed  2. Monitor temperature, if fever begins, tyelenol or ibuprofen can be used as long as you have no history of abnormal reactions to these medications. Follow the instructions on the bottle or contact clinic with questions.   3. Please contact clinic if symptoms begin to get worse.   4. Report to the ER if you feel your symptoms are severe and life threatening.          Follow up if symptoms worsen or fail to improve.

## 2022-02-11 ENCOUNTER — OFFICE VISIT (OUTPATIENT)
Dept: FAMILY MEDICINE | Facility: CLINIC | Age: 52
End: 2022-02-11
Payer: MEDICARE

## 2022-02-11 VITALS
BODY MASS INDEX: 31.53 KG/M2 | WEIGHT: 167 LBS | SYSTOLIC BLOOD PRESSURE: 139 MMHG | HEART RATE: 78 BPM | DIASTOLIC BLOOD PRESSURE: 92 MMHG | HEIGHT: 61 IN | TEMPERATURE: 98 F | OXYGEN SATURATION: 97 % | RESPIRATION RATE: 18 BRPM

## 2022-02-11 DIAGNOSIS — E03.9 HYPOTHYROIDISM (ACQUIRED): Primary | ICD-10-CM

## 2022-02-11 LAB
T4 FREE SERPL-MCNC: 0.98 NG/DL (ref 0.76–1.46)
TSH SERPL DL<=0.005 MIU/L-ACNC: 1.32 UIU/ML (ref 0.36–3.74)

## 2022-02-11 PROCEDURE — 3080F PR MOST RECENT DIASTOLIC BLOOD PRESSURE >= 90 MM HG: ICD-10-PCS | Mod: ,,, | Performed by: FAMILY MEDICINE

## 2022-02-11 PROCEDURE — 1160F PR REVIEW ALL MEDS BY PRESCRIBER/CLIN PHARMACIST DOCUMENTED: ICD-10-PCS | Mod: ,,, | Performed by: FAMILY MEDICINE

## 2022-02-11 PROCEDURE — 84443 ASSAY THYROID STIM HORMONE: CPT | Mod: ,,, | Performed by: CLINICAL MEDICAL LABORATORY

## 2022-02-11 PROCEDURE — 99213 PR OFFICE/OUTPT VISIT, EST, LEVL III, 20-29 MIN: ICD-10-PCS | Mod: ,,, | Performed by: FAMILY MEDICINE

## 2022-02-11 PROCEDURE — 1159F PR MEDICATION LIST DOCUMENTED IN MEDICAL RECORD: ICD-10-PCS | Mod: ,,, | Performed by: FAMILY MEDICINE

## 2022-02-11 PROCEDURE — 3008F BODY MASS INDEX DOCD: CPT | Mod: ,,, | Performed by: FAMILY MEDICINE

## 2022-02-11 PROCEDURE — 1160F RVW MEDS BY RX/DR IN RCRD: CPT | Mod: ,,, | Performed by: FAMILY MEDICINE

## 2022-02-11 PROCEDURE — 1159F MED LIST DOCD IN RCRD: CPT | Mod: ,,, | Performed by: FAMILY MEDICINE

## 2022-02-11 PROCEDURE — 3008F PR BODY MASS INDEX (BMI) DOCUMENTED: ICD-10-PCS | Mod: ,,, | Performed by: FAMILY MEDICINE

## 2022-02-11 PROCEDURE — 3075F SYST BP GE 130 - 139MM HG: CPT | Mod: ,,, | Performed by: FAMILY MEDICINE

## 2022-02-11 PROCEDURE — 3075F PR MOST RECENT SYSTOLIC BLOOD PRESS GE 130-139MM HG: ICD-10-PCS | Mod: ,,, | Performed by: FAMILY MEDICINE

## 2022-02-11 PROCEDURE — 99213 OFFICE O/P EST LOW 20 MIN: CPT | Mod: ,,, | Performed by: FAMILY MEDICINE

## 2022-02-11 PROCEDURE — 84439 THYROID PANEL: ICD-10-PCS | Mod: ,,, | Performed by: CLINICAL MEDICAL LABORATORY

## 2022-02-11 PROCEDURE — 84443 THYROID PANEL: ICD-10-PCS | Mod: ,,, | Performed by: CLINICAL MEDICAL LABORATORY

## 2022-02-11 PROCEDURE — 3080F DIAST BP >= 90 MM HG: CPT | Mod: ,,, | Performed by: FAMILY MEDICINE

## 2022-02-11 PROCEDURE — 84439 ASSAY OF FREE THYROXINE: CPT | Mod: ,,, | Performed by: CLINICAL MEDICAL LABORATORY

## 2022-02-11 RX ORDER — LEVOTHYROXINE SODIUM 100 UG/1
100 TABLET ORAL
Qty: 90 TABLET | Refills: 1 | Status: SHIPPED | OUTPATIENT
Start: 2022-02-11 | End: 2022-08-08 | Stop reason: SDUPTHER

## 2022-02-11 NOTE — PROGRESS NOTES
Chemo Trimble MD    [address] [place] [address]     PATIENT NAME: Shruthi Mcdermott  : 1970  DATE: 22  MRN: [id number]      Billing Provider: Chemo Trimble MD  Level of Service: [place] OFFICE/OUTPT VISIT, EST, LEVL III, 20-29 MIN  Patient PCP Information     Provider PCP Type    Iglesia Andres MD General          Reason for Visit / Chief Complaint: Follow-up (Follow up on chronic health problems and medication refills)       Update PCP  Update Chief Complaint         History of Present Illness / Problem Focused Workflow     Shruthi cMdermott presents to the clinic with Follow-up (Follow up on chronic health problems and medication refills)     HPI    Review of Systems     Review of Systems   Constitutional: Negative for activity change, appetite change, chills, fatigue and fever.   HENT: Negative for nasal congestion, ear pain, hearing loss, postnasal drip and sore throat.    Respiratory: Negative for cough, chest tightness, shortness of breath and wheezing.    Cardiovascular: Negative for chest pain, palpitations, leg swelling and claudication.   Gastrointestinal: Negative for abdominal pain, change in bowel habit, constipation, diarrhea, nausea, vomiting and change in bowel habit.   Genitourinary: Negative for dysuria.   Musculoskeletal: Negative for arthralgias, back pain, gait problem and myalgias.   Integumentary:  Negative for rash.   Neurological: Negative for weakness and headaches.   Psychiatric/Behavioral: Negative for suicidal ideas. The patient is not nervous/anxious.         Medical / Social / Family History     Past Medical History:   Diagnosis Date    Acute superficial gastritis without hemorrhage 2021    Adenomatous polyp of ascending colon 1/10/2022    Adenomatous polyp of descending colon 1/10/2022    Adenomatous polyp of sigmoid colon 1/10/2022    Diverticula, colon 1/10/2022    Mental impairment      Schizophrenia     Screening for malignant neoplasm of colon 1/10/2022    Urinary retention 7/12/2021       Past Surgical History:   Procedure Laterality Date    ANKLE SURGERY Right     CHOLECYSTECTOMY      DIAGNOSTIC LAPAROSCOPY      HYSTERECTOMY      OOPHORECTOMY      REDUCTION OF BOTH BREASTS      TOTAL REDUCTION MAMMOPLASTY         Social History  Ms.  reports that she has never smoked. She has never used smokeless tobacco. She reports that she does not drink alcohol and does not use drugs.    Family History  Ms.'s family history includes Heart disease in her mother; Hypertension in her father.    Medications and Allergies     Medications  Outpatient Medications Marked as Taking for the 2/11/22 encounter (Office Visit) with Chemo Trimble MD   Medication Sig Dispense Refill    ARIPiprazole (ABILIFY) 10 MG Tab Take 10 mg by mouth every morning.      benztropine (COGENTIN) 1 MG tablet Take 1 mg by mouth 2 (two) times daily.      COLACE 100 mg capsule Take 200 mg by mouth once daily.      DULoxetine (CYMBALTA) 60 MG capsule Take 60 mg by mouth once daily.      haloperidol decanoate (HALDOL DECANOATE) 100 mg/mL injection 100 mg every 21 days.      mirtazapine (REMERON) 15 MG tablet Take 7.5 mg by mouth every evening.      propranoloL (INDERAL) 10 MG tablet Take 10 mg by mouth 2 (two) times daily.      QUEtiapine (SEROQUEL XR) 400 MG Tb24 Take 400 mg by mouth once daily.      traZODone (DESYREL) 100 MG tablet Take 100 mg by mouth nightly as needed.      [DISCONTINUED] levothyroxine (SYNTHROID) 100 MCG tablet Take 100 mcg by mouth before breakfast.         Allergies  Review of patient's allergies indicates:  No Known Allergies    Physical Examination     Vitals:    02/11/22 1343   BP: (!) 139/92   Pulse:    Resp:    Temp:      Physical Exam  Vitals and nursing note reviewed.   Constitutional:       General: She is not in acute distress.     Appearance: Normal appearance. She is not  ill-appearing.   Eyes:      Extraocular Movements: Extraocular movements intact.      Pupils: Pupils are equal, round, and reactive to light.   Cardiovascular:      Rate and Rhythm: Normal rate and regular rhythm.      Heart sounds: Normal heart sounds.   Pulmonary:      Effort: Pulmonary effort is normal.      Breath sounds: Normal breath sounds.   Abdominal:      General: Bowel sounds are normal.      Palpations: Abdomen is soft.   Musculoskeletal:         General: Normal range of motion.   Skin:     Findings: No rash.   Neurological:      General: No focal deficit present.      Mental Status: She is alert and oriented to person, place, and time. Mental status is at baseline.   Psychiatric:         Mood and Affect: Mood normal.         Behavior: Behavior normal.          Assessment and Plan (including Health Maintenance)      Problem List  Smart Sets  Document Outside HM   :    Plan:         Health Maintenance Due   Topic Date Due    Lipid Panel  Never done    TETANUS VACCINE  Never done       Problem List Items Addressed This Visit    None     Visit Diagnoses     Hypothyroidism (acquired)    -  Primary    Relevant Medications    levothyroxine (SYNTHROID) 100 MCG tablet    Other Relevant Orders    Thyroid Panel        Hypothyroidism (acquired)  -     levothyroxine (SYNTHROID) 100 MCG tablet; Take 1 tablet (100 mcg total) by mouth before breakfast. For THYROID  Dispense: 90 tablet; Refill: 1  -     Thyroid Panel; Future; Expected date: 02/11/2022       Health Maintenance Topics with due status: Not Due       Topic Last Completion Date    Mammogram 11/30/2021    Colorectal Cancer Screening 01/10/2022       Procedures     Future Appointments   Date Time Provider Department Center   12/6/2022 10:00 AM [facility] [place]        Follow up in about 6 months (around 8/11/2022).     Signature:  Chemo Trimble MD  [address] [place]  [address]  Phone #: [phone]  Fax #: [phone]    Date of encounter: 2/11/22    Patient Instructions   Patient Education       Hypothyroidism (Underactive Thyroid) Discharge Instructions   About this topic   The thyroid is a butterfly-shaped gland found in the lower part of the front neck. This gland makes hormones that control how your body uses energy. When your body does not make enough of the thyroid hormones, the cells in your body slow down and you have little energy. This is known as hypothyroidism. Hypothyroidism can cause:  · weight gain  · joint pain  · problems getting pregnant  · low mood or mood swings  · heart problems  Doctors can treat this illness with drugs.  What care is needed at home?   · Ask your doctor what you need to do when you go home. Make sure you ask questions if you do not understand what the doctor says. This way you will know what you need to do.  · Your doctor may give you drugs to control the signs. Take them as ordered by your doctor. Do not stop taking these drugs, even if you feel better, unless ordered by your doctor.  · Get plenty of rest. Sleep when you are feeling tired.  · You may find you need extra clothing or covers to stay warm.  What follow-up care is needed?   · Your doctor may ask you to make visits to the office to check on your progress. Be sure to keep these visits.  · You doctor may order a blood test to check the thyroid level in your blood. Do not miss these visits. It is important to make sure you do not have too much or too little thyroid hormone.  What drugs may be needed?   The doctor may order drugs to:  · Replace your thyroid hormones  Will physical activity be limited?   You may have to limit your activity for a short period of time. Avoid doing stressful and heavy activities. Talk to your doctor about the right amount of activity for you.  What changes to diet are needed?   · Ask your doctor about any diet changes you may need to make. Talk to the  doctor to see if you need to watch the amount of iodine in your diet.  · Eat foods high in fiber to prevent hard stools.  What problems could happen?   · Swelling of the thyroid gland  · Heart problems like irregular heartbeat or heart failure  · Mental health issues like low mood  · Puffy face, hands, and feet  · Not able to get pregnant  · Birth defects  When do I need to call the doctor?   · Hyperthyroidism signs include restlessness, rapid weight loss, and sweating  · Low temperature, low blood sugar, slow breathing, not responsive  · Feeling very tired  · Puffy hands, face, or feet  · Irregular heartbeat  · Confusion  · You are not feeling better in 2 to 3 days or you are feeling worse  Teach Back: Helping You Understand   The Teach Back Method helps you understand the information we are giving you. The idea is simple. After talking with the staff, tell them in your own words what you were just told. This helps to make sure the staff has covered each thing clearly. It also helps to explain things that may have been a bit confusing. Before going home, make sure you are able to do these:  · I can tell you about my condition.  · I can tell you what changes I need to make with my diet or drugs.  · I can tell you what I will do if I feel restless, lose too much weight, feel very tired, or have an irregular heartbeat.  Where can I learn more?   American Academy of Family Physicians  http://familydoctor.org/familydoctor/en/diseases-conditions/hypothyroidism.printerview.all.html   National Endocrine and Metabolic Diseases Information Service  http://endocrine.niddk.nih.gov/pubs/Hypothyroidism/#symptoms   NHS Choices  http://www.nhs.uk/conditions/Thyroid-under-active/Pages/Introduction.aspx   Last Reviewed Date   2019-04-18  Consumer Information Use and Disclaimer   This information is not specific medical advice and does not replace information you receive from your health care provider. This is only a brief summary of  general information. It does NOT include all information about conditions, illnesses, injuries, tests, procedures, treatments, therapies, discharge instructions or life-style choices that may apply to you. You must talk with your health care provider for complete information about your health and treatment options. This information should not be used to decide whether or not to accept your health care providers advice, instructions or recommendations. Only your health care provider has the knowledge and training to provide advice that is right for you.  Copyright   Copyright © 2021 [employer] and its affiliates and/or licensors. All rights reserved.

## 2022-02-11 NOTE — PATIENT INSTRUCTIONS
Patient Education       Hypothyroidism (Underactive Thyroid) Discharge Instructions   About this topic   The thyroid is a butterfly-shaped gland found in the lower part of the front neck. This gland makes hormones that control how your body uses energy. When your body does not make enough of the thyroid hormones, the cells in your body slow down and you have little energy. This is known as hypothyroidism. Hypothyroidism can cause:  · weight gain  · joint pain  · problems getting pregnant  · low mood or mood swings  · heart problems  Doctors can treat this illness with drugs.  What care is needed at home?   · Ask your doctor what you need to do when you go home. Make sure you ask questions if you do not understand what the doctor says. This way you will know what you need to do.  · Your doctor may give you drugs to control the signs. Take them as ordered by your doctor. Do not stop taking these drugs, even if you feel better, unless ordered by your doctor.  · Get plenty of rest. Sleep when you are feeling tired.  · You may find you need extra clothing or covers to stay warm.  What follow-up care is needed?   · Your doctor may ask you to make visits to the office to check on your progress. Be sure to keep these visits.  · You doctor may order a blood test to check the thyroid level in your blood. Do not miss these visits. It is important to make sure you do not have too much or too little thyroid hormone.  What drugs may be needed?   The doctor may order drugs to:  · Replace your thyroid hormones  Will physical activity be limited?   You may have to limit your activity for a short period of time. Avoid doing stressful and heavy activities. Talk to your doctor about the right amount of activity for you.  What changes to diet are needed?   · Ask your doctor about any diet changes you may need to make. Talk to the doctor to see if you need to watch the amount of iodine in your diet.  · Eat foods high in fiber to prevent  hard stools.  What problems could happen?   · Swelling of the thyroid gland  · Heart problems like irregular heartbeat or heart failure  · Mental health issues like low mood  · Puffy face, hands, and feet  · Not able to get pregnant  · Birth defects  When do I need to call the doctor?   · Hyperthyroidism signs include restlessness, rapid weight loss, and sweating  · Low temperature, low blood sugar, slow breathing, not responsive  · Feeling very tired  · Puffy hands, face, or feet  · Irregular heartbeat  · Confusion  · You are not feeling better in 2 to 3 days or you are feeling worse  Teach Back: Helping You Understand   The Teach Back Method helps you understand the information we are giving you. The idea is simple. After talking with the staff, tell them in your own words what you were just told. This helps to make sure the staff has covered each thing clearly. It also helps to explain things that may have been a bit confusing. Before going home, make sure you are able to do these:  · I can tell you about my condition.  · I can tell you what changes I need to make with my diet or drugs.  · I can tell you what I will do if I feel restless, lose too much weight, feel very tired, or have an irregular heartbeat.  Where can I learn more?   American Academy of Family Physicians  http://familydoctor.org/familydoctor/en/diseases-conditions/hypothyroidism.printerview.all.html   National Endocrine and Metabolic Diseases Information Service  http://endocrine.niddk.nih.gov/pubs/Hypothyroidism/#symptoms   NHS Choices  http://www.nhs.uk/conditions/Thyroid-under-active/Pages/Introduction.aspx   Last Reviewed Date   2019-04-18  Consumer Information Use and Disclaimer   This information is not specific medical advice and does not replace information you receive from your health care provider. This is only a brief summary of general information. It does NOT include all information about conditions, illnesses, injuries, tests,  procedures, treatments, therapies, discharge instructions or life-style choices that may apply to you. You must talk with your health care provider for complete information about your health and treatment options. This information should not be used to decide whether or not to accept your health care providers advice, instructions or recommendations. Only your health care provider has the knowledge and training to provide advice that is right for you.  Copyright   Copyright © 2021 [employer] and its affiliates and/or licensors. All rights reserved.

## 2022-06-29 ENCOUNTER — OFFICE VISIT (OUTPATIENT)
Dept: FAMILY MEDICINE | Facility: CLINIC | Age: 52
End: 2022-06-29
Payer: MEDICARE

## 2022-06-29 VITALS
DIASTOLIC BLOOD PRESSURE: 89 MMHG | SYSTOLIC BLOOD PRESSURE: 130 MMHG | OXYGEN SATURATION: 98 % | HEIGHT: 64 IN | HEART RATE: 79 BPM | TEMPERATURE: 99 F | RESPIRATION RATE: 20 BRPM | WEIGHT: 172 LBS | BODY MASS INDEX: 29.37 KG/M2

## 2022-06-29 DIAGNOSIS — R09.81 NASAL CONGESTION: ICD-10-CM

## 2022-06-29 DIAGNOSIS — R51.9 NONINTRACTABLE HEADACHE, UNSPECIFIED CHRONICITY PATTERN, UNSPECIFIED HEADACHE TYPE: ICD-10-CM

## 2022-06-29 DIAGNOSIS — R05.9 COUGH: ICD-10-CM

## 2022-06-29 DIAGNOSIS — R50.9 FEVER, UNSPECIFIED FEVER CAUSE: ICD-10-CM

## 2022-06-29 DIAGNOSIS — J32.9 SINUSITIS, UNSPECIFIED CHRONICITY, UNSPECIFIED LOCATION: Primary | ICD-10-CM

## 2022-06-29 DIAGNOSIS — J02.9 SORE THROAT: ICD-10-CM

## 2022-06-29 DIAGNOSIS — R53.83 FATIGUE, UNSPECIFIED TYPE: ICD-10-CM

## 2022-06-29 LAB
CTP QC/QA: YES
CTP QC/QA: YES
FLUAV AG NPH QL: NEGATIVE
FLUBV AG NPH QL: NEGATIVE
S PYO RRNA THROAT QL PROBE: NEGATIVE
SARS-COV-2 AG RESP QL IA.RAPID: NEGATIVE

## 2022-06-29 PROCEDURE — 1159F MED LIST DOCD IN RCRD: CPT | Mod: ,,, | Performed by: NURSE PRACTITIONER

## 2022-06-29 PROCEDURE — 1160F RVW MEDS BY RX/DR IN RCRD: CPT | Mod: ,,, | Performed by: NURSE PRACTITIONER

## 2022-06-29 PROCEDURE — 87880 POCT RAPID STREP A: ICD-10-PCS | Mod: QW,,, | Performed by: NURSE PRACTITIONER

## 2022-06-29 PROCEDURE — 3075F SYST BP GE 130 - 139MM HG: CPT | Mod: ,,, | Performed by: NURSE PRACTITIONER

## 2022-06-29 PROCEDURE — 1160F PR REVIEW ALL MEDS BY PRESCRIBER/CLIN PHARMACIST DOCUMENTED: ICD-10-PCS | Mod: ,,, | Performed by: NURSE PRACTITIONER

## 2022-06-29 PROCEDURE — 87428 POCT SARS-COV2 (COVID) WITH FLU ANTIGEN: ICD-10-PCS | Mod: QW,,, | Performed by: NURSE PRACTITIONER

## 2022-06-29 PROCEDURE — 3079F DIAST BP 80-89 MM HG: CPT | Mod: ,,, | Performed by: NURSE PRACTITIONER

## 2022-06-29 PROCEDURE — 3008F BODY MASS INDEX DOCD: CPT | Mod: ,,, | Performed by: NURSE PRACTITIONER

## 2022-06-29 PROCEDURE — 99214 OFFICE O/P EST MOD 30 MIN: CPT | Mod: ,,, | Performed by: NURSE PRACTITIONER

## 2022-06-29 PROCEDURE — 99214 PR OFFICE/OUTPT VISIT, EST, LEVL IV, 30-39 MIN: ICD-10-PCS | Mod: ,,, | Performed by: NURSE PRACTITIONER

## 2022-06-29 PROCEDURE — 3079F PR MOST RECENT DIASTOLIC BLOOD PRESSURE 80-89 MM HG: ICD-10-PCS | Mod: ,,, | Performed by: NURSE PRACTITIONER

## 2022-06-29 PROCEDURE — 1159F PR MEDICATION LIST DOCUMENTED IN MEDICAL RECORD: ICD-10-PCS | Mod: ,,, | Performed by: NURSE PRACTITIONER

## 2022-06-29 PROCEDURE — 87880 STREP A ASSAY W/OPTIC: CPT | Mod: QW,,, | Performed by: NURSE PRACTITIONER

## 2022-06-29 PROCEDURE — 3008F PR BODY MASS INDEX (BMI) DOCUMENTED: ICD-10-PCS | Mod: ,,, | Performed by: NURSE PRACTITIONER

## 2022-06-29 PROCEDURE — 87428 SARSCOV & INF VIR A&B AG IA: CPT | Mod: QW,,, | Performed by: NURSE PRACTITIONER

## 2022-06-29 PROCEDURE — 3075F PR MOST RECENT SYSTOLIC BLOOD PRESS GE 130-139MM HG: ICD-10-PCS | Mod: ,,, | Performed by: NURSE PRACTITIONER

## 2022-06-29 RX ORDER — AZITHROMYCIN 250 MG/1
TABLET, FILM COATED ORAL
Qty: 6 TABLET | Refills: 0 | Status: SHIPPED | OUTPATIENT
Start: 2022-06-29 | End: 2022-07-19

## 2022-06-29 RX ORDER — FLUTICASONE PROPIONATE 50 MCG
1 SPRAY, SUSPENSION (ML) NASAL DAILY
Qty: 16 G | Refills: 0 | Status: SHIPPED | OUTPATIENT
Start: 2022-06-29 | End: 2022-07-19

## 2022-06-29 NOTE — PROGRESS NOTES
Yaritza Garsia DNP, BUTCH    [address] [place] [address]     PATIENT NAME: Shruthi Mcdermott  : 1970  DATE: 22  MRN: [id number]      Billing Provider: Yaritza Garsia DNP, FNP  Level of Service:   Patient PCP Information     Provider PCP Type    Iglesia Andres MD General          Reason for Visit / Chief Complaint: Cough, Fatigue, Fever, Nasal Congestion, Sore Throat, and Nausea (Symptoms started . Complain of cough non productive, fatigue, fever, nasal congestion, nausea, and sore throat. Otc med generic sinus med. Patient does not know the name. No covid vaccine. Patient was seen about 2 weeks ago at [facility] for strep with penicillin. )       Update PCP  Update Chief Complaint         History of Present Illness / Problem Focused Workflow     Shruthi Mcdermott presents to the clinic with Cough, Fatigue, Fever, Nasal Congestion, Sore Throat, and Nausea (Symptoms started . Complain of cough non productive, fatigue, fever, nasal congestion, nausea, and sore throat. Otc med generic sinus med. Patient does not know the name. No covid vaccine. Patient was seen about 2 weeks ago at [facility] for strep with penicillin. )     Pt c/o dry cough, nasal congestion, and postnasal drainage x 2 weeks. Pt was treated at ED for strep throat approx 2 weeks ago. Denies any fever. Sister recently diagnosed with covid.       Review of Systems     Review of Systems   Constitutional: Positive for fatigue. Negative for appetite change, fever and unexpected weight change.   HENT: Positive for nasal congestion and sore throat. Negative for hearing loss.    Eyes: Negative for visual disturbance.   Respiratory: Positive for cough. Negative for shortness of breath.    Cardiovascular: Negative for chest pain.   Gastrointestinal: Negative for abdominal pain, constipation, diarrhea, nausea and vomiting.   Genitourinary: Negative for dysuria.   Musculoskeletal: Negative for  back pain.   Neurological: Positive for headaches.   Psychiatric/Behavioral: Negative for sleep disturbance.        Medical / Social / Family History     Past Medical History:   Diagnosis Date    Acute superficial gastritis without hemorrhage 12/20/2021    Adenomatous polyp of ascending colon 1/10/2022    Adenomatous polyp of descending colon 1/10/2022    Adenomatous polyp of sigmoid colon 1/10/2022    Chronic headaches     Diverticula, colon 1/10/2022    Mental impairment     Schizophrenia     Screening for malignant neoplasm of colon 1/10/2022    Urinary retention 7/12/2021       Past Surgical History:   Procedure Laterality Date    ANKLE SURGERY Right     CHOLECYSTECTOMY      DIAGNOSTIC LAPAROSCOPY      HYSTERECTOMY      OOPHORECTOMY      REDUCTION OF BOTH BREASTS      TOTAL REDUCTION MAMMOPLASTY         Social History  Ms. Shruthi Mcdermott  reports that she has never smoked. She has never used smokeless tobacco. She reports that she does not drink alcohol and does not use drugs.    Family History  Ms. Shruthi Mcdermott's family history includes Heart disease in her mother; Hypertension in her father.    Medications and Allergies     Medications  Outpatient Medications Marked as Taking for the 6/29/22 encounter (Office Visit) with Yaritza Garsia, SJ, FNP   Medication Sig Dispense Refill    ARIPiprazole (ABILIFY) 10 MG Tab Take 10 mg by mouth every morning.      benztropine (COGENTIN) 1 MG tablet Take 1 mg by mouth 2 (two) times daily.      COLACE 100 mg capsule Take 200 mg by mouth once daily.      DULoxetine (CYMBALTA) 60 MG capsule Take 60 mg by mouth once daily.      haloperidol decanoate (HALDOL DECANOATE) 100 mg/mL injection 100 mg every 21 days.      HYDROcodone-acetaminophen (NORCO) 7.5-325 mg per tablet       hydrOXYzine pamoate (VISTARIL) 50 MG Cap Take 50 mg by mouth nightly as needed.      levothyroxine (SYNTHROID) 100 MCG tablet Take 1 tablet (100 mcg total) by mouth  before breakfast. For THYROID 90 tablet 1    mirtazapine (REMERON) 15 MG tablet Take 7.5 mg by mouth every evening.      propranoloL (INDERAL) 10 MG tablet Take 10 mg by mouth 2 (two) times daily.      QUEtiapine (SEROQUEL XR) 400 MG Tb24 Take 400 mg by mouth once daily.         Allergies  Review of patient's allergies indicates:  No Known Allergies    Physical Examination     Vitals:    06/29/22 0815   BP: 130/89   Pulse: 79   Resp: 20   Temp: 99 °F (37.2 °C)     Physical Exam  Vitals and nursing note reviewed.   Constitutional:       General: She is not in acute distress.  HENT:      Nose: Congestion and rhinorrhea present.      Mouth/Throat:      Mouth: Mucous membranes are moist.      Pharynx: No oropharyngeal exudate or posterior oropharyngeal erythema.   Eyes:      Pupils: Pupils are equal, round, and reactive to light.   Cardiovascular:      Rate and Rhythm: Normal rate and regular rhythm.      Pulses: Normal pulses.      Heart sounds: Normal heart sounds. No murmur heard.  Pulmonary:      Effort: Pulmonary effort is normal. No respiratory distress.      Breath sounds: Normal breath sounds. No wheezing, rhonchi or rales.   Chest:      Chest wall: No tenderness.   Abdominal:      General: Bowel sounds are normal.      Palpations: Abdomen is soft.   Musculoskeletal:         General: Normal range of motion.      Cervical back: Normal range of motion and neck supple.      Right lower leg: No edema.      Left lower leg: No edema.   Skin:     General: Skin is warm and dry.   Neurological:      General: No focal deficit present.      Mental Status: She is alert and oriented to person, place, and time.          Assessment and Plan (including Health Maintenance)      Problem List  Smart Sets  Document Outside HM   :    Plan:         Health Maintenance Due   Topic Date Due    Hepatitis C Screening  Never done    Lipid Panel  Never done    TETANUS VACCINE  Never done       Problem List Items Addressed This Visit     None     Visit Diagnoses     Sinusitis, unspecified chronicity, unspecified location    -  Primary    Relevant Medications    azithromycin (ZITHROMAX Z-CHALINO) 250 MG tablet    chlorpheniramine-phenylephrine 4-10 mg per tablet    fluticasone propionate (FLONASE) 50 mcg/actuation nasal spray    Cough        Relevant Orders    POCT SARS-COV2 (COVID) with Flu Antigen (Completed)    Nonintractable headache, unspecified chronicity pattern, unspecified headache type        Relevant Orders    POCT SARS-COV2 (COVID) with Flu Antigen (Completed)    Fatigue, unspecified type        Relevant Orders    POCT SARS-COV2 (COVID) with Flu Antigen (Completed)    Fever, unspecified fever cause        Relevant Orders    POCT SARS-COV2 (COVID) with Flu Antigen (Completed)    Nasal congestion        Relevant Orders    POCT SARS-COV2 (COVID) with Flu Antigen (Completed)    Sore throat        Relevant Orders    POCT SARS-COV2 (COVID) with Flu Antigen (Completed)    POCT rapid strep A (Completed)        Sinusitis, unspecified chronicity, unspecified location  -     azithromycin (ZITHROMAX Z-CHALINO) 250 MG tablet; Take 2 tablets on Day 1 and 1 tablet daily for next 4 days.  Dispense: 6 tablet; Refill: 0  -     chlorpheniramine-phenylephrine 4-10 mg per tablet; Take 1 tablet by mouth 3 (three) times daily as needed for Congestion.  Dispense: 30 tablet; Refill: 0  -     fluticasone propionate (FLONASE) 50 mcg/actuation nasal spray; 1 spray (50 mcg total) by Each Nostril route once daily.  Dispense: 16 g; Refill: 0    Cough  -     POCT SARS-COV2 (COVID) with Flu Antigen    Nonintractable headache, unspecified chronicity pattern, unspecified headache type  -     POCT SARS-COV2 (COVID) with Flu Antigen    Fatigue, unspecified type  -     POCT SARS-COV2 (COVID) with Flu Antigen    Fever, unspecified fever cause  -     POCT SARS-COV2 (COVID) with Flu Antigen    Nasal congestion  -     POCT SARS-COV2 (COVID) with Flu Antigen    Sore throat  -     POCT  SARS-COV2 (COVID) with Flu Antigen  -     POCT rapid strep A       Health Maintenance Topics with due status: Not Due       Topic Last Completion Date    Mammogram 11/30/2021    Colorectal Cancer Screening 01/10/2022    Influenza Vaccine Not Due           Future Appointments   Date Time Provider Department Center   12/6/2022 10:00 AM [facility]        Follow up if symptoms worsen or fail to improve.     Signature:  Yaritza Garsia DNP, FNP  [address] [place] [address]  Phone #: [phone]  Fax #: [phone]    Date of encounter: 6/29/22    Patient Instructions   Increase fluid intake. Take meds as prescribed. Follow up if no improvement in 5-7 days.

## 2022-07-19 ENCOUNTER — OFFICE VISIT (OUTPATIENT)
Dept: FAMILY MEDICINE | Facility: CLINIC | Age: 52
End: 2022-07-19
Payer: MEDICARE

## 2022-07-19 VITALS
RESPIRATION RATE: 18 BRPM | TEMPERATURE: 98 F | DIASTOLIC BLOOD PRESSURE: 83 MMHG | WEIGHT: 173.38 LBS | SYSTOLIC BLOOD PRESSURE: 107 MMHG | BODY MASS INDEX: 29.6 KG/M2 | HEIGHT: 64 IN | HEART RATE: 88 BPM | OXYGEN SATURATION: 98 %

## 2022-07-19 DIAGNOSIS — B37.31 VAGINAL YEAST INFECTION: ICD-10-CM

## 2022-07-19 DIAGNOSIS — R30.0 DYSURIA: ICD-10-CM

## 2022-07-19 DIAGNOSIS — M54.50 ACUTE LOW BACK PAIN, UNSPECIFIED BACK PAIN LATERALITY, UNSPECIFIED WHETHER SCIATICA PRESENT: Primary | ICD-10-CM

## 2022-07-19 LAB
AMORPHOUS CRYSTALS, UA (OHS): ABNORMAL /HPF
BACTERIA #/AREA URNS HPF: ABNORMAL /HPF
BILIRUB SERPL-MCNC: ABNORMAL MG/DL
BILIRUB UR QL STRIP: NEGATIVE
BLOOD URINE, POC: NEGATIVE
CAOX CRY URNS QL MICRO: ABNORMAL /HPF
CLARITY UR: ABNORMAL
COLOR UR: YELLOW
COLOR, POC UA: YELLOW
GLUCOSE UR QL STRIP: NEGATIVE
GLUCOSE UR STRIP-MCNC: NORMAL MG/DL
KETONES UR QL STRIP: NEGATIVE
KETONES UR STRIP-SCNC: NEGATIVE MG/DL
LEUKOCYTE ESTERASE UR QL STRIP: ABNORMAL
LEUKOCYTE ESTERASE URINE, POC: NEGATIVE
NITRITE UR QL STRIP: NEGATIVE
NITRITE, POC UA: NEGATIVE
PH UR STRIP: 5.5 PH UNITS
PH, POC UA: 5.5
PROT UR QL STRIP: 20
PROTEIN, POC: NEGATIVE
RBC # UR STRIP: NEGATIVE /UL
SP GR UR STRIP: 1.03
SPECIFIC GRAVITY, POC UA: 1.03
UROBILINOGEN UR STRIP-ACNC: NORMAL MG/DL
UROBILINOGEN, POC UA: 0.2
WBC #/AREA URNS HPF: ABNORMAL /[HPF]

## 2022-07-19 PROCEDURE — 99214 PR OFFICE/OUTPT VISIT, EST, LEVL IV, 30-39 MIN: ICD-10-PCS | Mod: ,,, | Performed by: NURSE PRACTITIONER

## 2022-07-19 PROCEDURE — 87186 CULTURE, URINE: ICD-10-PCS | Mod: ,,, | Performed by: CLINICAL MEDICAL LABORATORY

## 2022-07-19 PROCEDURE — 99214 OFFICE O/P EST MOD 30 MIN: CPT | Mod: ,,, | Performed by: NURSE PRACTITIONER

## 2022-07-19 PROCEDURE — 3079F PR MOST RECENT DIASTOLIC BLOOD PRESSURE 80-89 MM HG: ICD-10-PCS | Mod: ,,, | Performed by: NURSE PRACTITIONER

## 2022-07-19 PROCEDURE — 3079F DIAST BP 80-89 MM HG: CPT | Mod: ,,, | Performed by: NURSE PRACTITIONER

## 2022-07-19 PROCEDURE — 3008F BODY MASS INDEX DOCD: CPT | Mod: ,,, | Performed by: NURSE PRACTITIONER

## 2022-07-19 PROCEDURE — 1159F PR MEDICATION LIST DOCUMENTED IN MEDICAL RECORD: ICD-10-PCS | Mod: ,,, | Performed by: NURSE PRACTITIONER

## 2022-07-19 PROCEDURE — 1160F PR REVIEW ALL MEDS BY PRESCRIBER/CLIN PHARMACIST DOCUMENTED: ICD-10-PCS | Mod: ,,, | Performed by: NURSE PRACTITIONER

## 2022-07-19 PROCEDURE — 81003 POCT URINALYSIS W/O SCOPE: ICD-10-PCS | Mod: QW,,, | Performed by: NURSE PRACTITIONER

## 2022-07-19 PROCEDURE — 81001 URINALYSIS: ICD-10-PCS | Mod: ,,, | Performed by: CLINICAL MEDICAL LABORATORY

## 2022-07-19 PROCEDURE — 81001 URINALYSIS AUTO W/SCOPE: CPT | Mod: ,,, | Performed by: CLINICAL MEDICAL LABORATORY

## 2022-07-19 PROCEDURE — 81003 URINALYSIS AUTO W/O SCOPE: CPT | Mod: QW,,, | Performed by: NURSE PRACTITIONER

## 2022-07-19 PROCEDURE — 87186 SC STD MICRODIL/AGAR DIL: CPT | Mod: ,,, | Performed by: CLINICAL MEDICAL LABORATORY

## 2022-07-19 PROCEDURE — 87086 CULTURE, URINE: ICD-10-PCS | Mod: ,,, | Performed by: CLINICAL MEDICAL LABORATORY

## 2022-07-19 PROCEDURE — 3074F PR MOST RECENT SYSTOLIC BLOOD PRESSURE < 130 MM HG: ICD-10-PCS | Mod: ,,, | Performed by: NURSE PRACTITIONER

## 2022-07-19 PROCEDURE — 3008F PR BODY MASS INDEX (BMI) DOCUMENTED: ICD-10-PCS | Mod: ,,, | Performed by: NURSE PRACTITIONER

## 2022-07-19 PROCEDURE — 1160F RVW MEDS BY RX/DR IN RCRD: CPT | Mod: ,,, | Performed by: NURSE PRACTITIONER

## 2022-07-19 PROCEDURE — 87077 CULTURE AEROBIC IDENTIFY: CPT | Mod: ,,, | Performed by: CLINICAL MEDICAL LABORATORY

## 2022-07-19 PROCEDURE — 87077 CULTURE, URINE: ICD-10-PCS | Mod: ,,, | Performed by: CLINICAL MEDICAL LABORATORY

## 2022-07-19 PROCEDURE — 1159F MED LIST DOCD IN RCRD: CPT | Mod: ,,, | Performed by: NURSE PRACTITIONER

## 2022-07-19 PROCEDURE — 87086 URINE CULTURE/COLONY COUNT: CPT | Mod: ,,, | Performed by: CLINICAL MEDICAL LABORATORY

## 2022-07-19 PROCEDURE — 3074F SYST BP LT 130 MM HG: CPT | Mod: ,,, | Performed by: NURSE PRACTITIONER

## 2022-07-19 RX ORDER — FLUCONAZOLE 150 MG/1
150 TABLET ORAL DAILY
Qty: 2 TABLET | Refills: 0 | Status: SHIPPED | OUTPATIENT
Start: 2022-07-19 | End: 2023-07-28

## 2022-07-19 NOTE — PROGRESS NOTES
Yaritza Garsia DNP, BUTCH    [address] [place] [address]     PATIENT NAME: Shruthi Mcdermott  : 1970  DATE: 22  MRN: [id number]      Billing Provider: Yaritza Garsia DNP, BUTCH  Level of Service:   Patient PCP Information     Provider PCP Type    Iglesia Andres MD General          Reason for Visit / Chief Complaint: Vaginitis (Patient complains of yeast infection after being on antibiotics. Complains of burning while urinating. )       Update PCP  Update Chief Complaint         History of Present Illness / Problem Focused Workflow     Shruthi Mcdermott presents to the clinic with Vaginitis (Patient complains of yeast infection after being on antibiotics. Complains of burning while urinating. )     Pt c/o dysuria for approx 3 days. Pt recently completed antibiotic therapy for strep. Pt c/o irritation and itching.       Review of Systems     Review of Systems   Constitutional: Negative for activity change, appetite change, chills, fatigue and fever.   HENT: Negative for nasal congestion, ear pain, hearing loss, postnasal drip and sore throat.    Respiratory: Negative for cough, chest tightness, shortness of breath and wheezing.    Cardiovascular: Negative for chest pain, palpitations, leg swelling and claudication.   Gastrointestinal: Negative for abdominal pain, change in bowel habit, constipation, diarrhea, nausea, vomiting and change in bowel habit.   Genitourinary: Positive for dysuria.   Musculoskeletal: Negative for arthralgias, back pain, gait problem and myalgias.   Integumentary:  Negative for rash.   Neurological: Negative for weakness and headaches.   Psychiatric/Behavioral: Negative for suicidal ideas. The patient is not nervous/anxious.         Medical / Social / Family History     Past Medical History:   Diagnosis Date    Acute superficial gastritis without hemorrhage 2021    Adenomatous polyp of ascending colon 1/10/2022     Adenomatous polyp of descending colon 1/10/2022    Adenomatous polyp of sigmoid colon 1/10/2022    Chronic headaches     Diverticula, colon 1/10/2022    Mental impairment     Schizophrenia     Screening for malignant neoplasm of colon 1/10/2022    Urinary retention 7/12/2021       Past Surgical History:   Procedure Laterality Date    ANKLE SURGERY Right     CHOLECYSTECTOMY      DIAGNOSTIC LAPAROSCOPY      HYSTERECTOMY      OOPHORECTOMY      REDUCTION OF BOTH BREASTS      TOTAL REDUCTION MAMMOPLASTY         Social History  Ms. Shruthi Mcdermott  reports that she has never smoked. She has never used smokeless tobacco. She reports that she does not drink alcohol and does not use drugs.    Family History  Ms. Shruthi Mcdermott's family history includes Heart disease in her mother; Hypertension in her father.    Medications and Allergies     Medications  Outpatient Medications Marked as Taking for the 7/19/22 encounter (Office Visit) with Yaritza Garsia, SJ, FNP   Medication Sig Dispense Refill    ARIPiprazole (ABILIFY) 10 MG Tab Take 10 mg by mouth every morning.      benztropine (COGENTIN) 1 MG tablet Take 1 mg by mouth 2 (two) times daily.      COLACE 100 mg capsule Take 200 mg by mouth once daily.      DULoxetine (CYMBALTA) 60 MG capsule Take 60 mg by mouth once daily.      haloperidol decanoate (HALDOL DECANOATE) 100 mg/mL injection 100 mg every 21 days.      hydrOXYzine pamoate (VISTARIL) 50 MG Cap Take 50 mg by mouth nightly as needed.      levothyroxine (SYNTHROID) 100 MCG tablet Take 1 tablet (100 mcg total) by mouth before breakfast. For THYROID 90 tablet 1    mirtazapine (REMERON) 15 MG tablet Take 7.5 mg by mouth every evening.      propranoloL (INDERAL) 10 MG tablet Take 10 mg by mouth 2 (two) times daily.      QUEtiapine (SEROQUEL) 400 MG tablet          Allergies  Review of patient's allergies indicates:  No Known Allergies    Physical Examination     Vitals:    07/19/22 1110    BP: 107/83   Pulse: 88   Resp: 18   Temp: 97.9 °F (36.6 °C)     Physical Exam  Vitals and nursing note reviewed.   Constitutional:       General: She is not in acute distress.     Appearance: Normal appearance. She is not ill-appearing.   HENT:      Nose: Nose normal.      Mouth/Throat:      Mouth: Mucous membranes are moist.   Eyes:      Extraocular Movements: Extraocular movements intact.      Pupils: Pupils are equal, round, and reactive to light.   Cardiovascular:      Rate and Rhythm: Normal rate and regular rhythm.      Pulses: Normal pulses.      Heart sounds: Normal heart sounds. No murmur heard.  Pulmonary:      Effort: Pulmonary effort is normal. No respiratory distress.      Breath sounds: Normal breath sounds. No wheezing, rhonchi or rales.   Chest:      Chest wall: No tenderness.   Abdominal:      General: Bowel sounds are normal.      Palpations: Abdomen is soft.   Musculoskeletal:         General: Normal range of motion.      Cervical back: Normal range of motion and neck supple.      Right lower leg: No edema.      Left lower leg: No edema.   Skin:     General: Skin is warm and dry.      Findings: No rash.   Neurological:      General: No focal deficit present.      Mental Status: She is alert and oriented to person, place, and time. Mental status is at baseline.   Psychiatric:         Mood and Affect: Mood normal.         Behavior: Behavior normal.          Assessment and Plan (including Health Maintenance)      Problem List  Smart Sets  Document Outside HM   :    Plan:         Health Maintenance Due   Topic Date Due    Hepatitis C Screening  Never done    Lipid Panel  Never done    TETANUS VACCINE  Never done       Problem List Items Addressed This Visit    None     Visit Diagnoses     Acute low back pain, unspecified back pain laterality, unspecified whether sciatica present    -  Primary    Relevant Orders    POCT URINALYSIS W/O SCOPE (Completed)    Dysuria        Relevant Orders    POCT  URINALYSIS W/O SCOPE (Completed)    Urine culture    Urinalysis    Vaginal yeast infection        Relevant Medications    fluconazole (DIFLUCAN) 150 MG Tab        Acute low back pain, unspecified back pain laterality, unspecified whether sciatica present  -     POCT URINALYSIS W/O SCOPE    Dysuria  -     POCT URINALYSIS W/O SCOPE  -     Urine culture  -     Urinalysis    Vaginal yeast infection  -     fluconazole (DIFLUCAN) 150 MG Tab; Take 1 tablet (150 mg total) by mouth once daily. Then repeat in 3 days if continue to have symptoms.  Dispense: 2 tablet; Refill: 0       Health Maintenance Topics with due status: Not Due       Topic Last Completion Date    Mammogram 11/30/2021    Colorectal Cancer Screening 01/10/2022    Influenza Vaccine Not Due           Future Appointments   Date Time Provider Department Center   12/6/2022 10:00 AM [facility] [place]        Follow up if symptoms worsen or fail to improve.     Signature:  Yaritza Garsia DNP, FNP  [address] [place] [address]  Phone #: [phone]  Fax #: [phone]    Date of encounter: 7/19/22    Patient Instructions   Take meds as prescribed. Use otc monistat for external use.

## 2022-07-21 LAB — UA COMPLETE W REFLEX CULTURE PNL UR: ABNORMAL

## 2022-07-22 ENCOUNTER — TELEPHONE (OUTPATIENT)
Dept: FAMILY MEDICINE | Facility: CLINIC | Age: 52
End: 2022-07-22
Payer: MEDICARE

## 2022-07-22 DIAGNOSIS — B96.89 UTI DUE TO KLEBSIELLA SPECIES: Primary | ICD-10-CM

## 2022-07-22 DIAGNOSIS — N39.0 UTI DUE TO KLEBSIELLA SPECIES: Primary | ICD-10-CM

## 2022-07-22 RX ORDER — SULFAMETHOXAZOLE AND TRIMETHOPRIM 800; 160 MG/1; MG/1
1 TABLET ORAL 2 TIMES DAILY
Qty: 14 TABLET | Refills: 0 | Status: SHIPPED | OUTPATIENT
Start: 2022-07-22 | End: 2023-07-28

## 2022-07-22 NOTE — TELEPHONE ENCOUNTER
Urine culture reviewed for [facility] Ritter while on vacation  Urine culture grew out - klebsiella pneu   Bactrim ds PO BID x 7 days sent in   Complete abx then repeat urinalysis

## 2022-08-08 DIAGNOSIS — E03.9 HYPOTHYROIDISM (ACQUIRED): ICD-10-CM

## 2022-08-08 RX ORDER — LEVOTHYROXINE SODIUM 100 UG/1
100 TABLET ORAL
Qty: 90 TABLET | Refills: 1 | Status: SHIPPED | OUTPATIENT
Start: 2022-08-08 | End: 2023-02-06 | Stop reason: SDUPTHER

## 2022-08-11 ENCOUNTER — HOSPITAL ENCOUNTER (OUTPATIENT)
Dept: RADIOLOGY | Facility: HOSPITAL | Age: 52
Discharge: HOME | End: 2022-08-11
Attending: FAMILY MEDICINE
Payer: MEDICARE

## 2022-08-11 ENCOUNTER — OFFICE VISIT (OUTPATIENT)
Dept: FAMILY MEDICINE | Facility: CLINIC | Age: 52
End: 2022-08-11
Payer: MEDICARE

## 2022-08-11 VITALS
SYSTOLIC BLOOD PRESSURE: 137 MMHG | OXYGEN SATURATION: 98 % | BODY MASS INDEX: 30.67 KG/M2 | HEART RATE: 75 BPM | HEIGHT: 64 IN | WEIGHT: 179.63 LBS | DIASTOLIC BLOOD PRESSURE: 86 MMHG | TEMPERATURE: 98 F | RESPIRATION RATE: 18 BRPM

## 2022-08-11 DIAGNOSIS — R51.9 ACUTE NONINTRACTABLE HEADACHE, UNSPECIFIED HEADACHE TYPE: ICD-10-CM

## 2022-08-11 DIAGNOSIS — E03.9 ACQUIRED HYPOTHYROIDISM: Chronic | ICD-10-CM

## 2022-08-11 DIAGNOSIS — R10.12 LEFT UPPER QUADRANT ABDOMINAL PAIN: ICD-10-CM

## 2022-08-11 DIAGNOSIS — R10.12 LEFT UPPER QUADRANT ABDOMINAL PAIN: Primary | ICD-10-CM

## 2022-08-11 DIAGNOSIS — K59.09 OTHER CONSTIPATION: ICD-10-CM

## 2022-08-11 DIAGNOSIS — R73.9 HYPERGLYCEMIA: ICD-10-CM

## 2022-08-11 DIAGNOSIS — R11.0 NAUSEA: ICD-10-CM

## 2022-08-11 LAB
ALBUMIN SERPL BCP-MCNC: 3.7 G/DL (ref 3.5–5)
ALBUMIN/GLOB SERPL: 1 {RATIO}
ALP SERPL-CCNC: 139 U/L (ref 41–108)
ALT SERPL W P-5'-P-CCNC: 21 U/L (ref 13–56)
ANION GAP SERPL CALCULATED.3IONS-SCNC: 13 MMOL/L (ref 7–16)
AST SERPL W P-5'-P-CCNC: 19 U/L (ref 15–37)
BASOPHILS # BLD AUTO: 0.02 K/UL (ref 0–0.2)
BASOPHILS NFR BLD AUTO: 0.2 % (ref 0–1)
BILIRUB SERPL-MCNC: 0.4 MG/DL (ref 0–1.2)
BILIRUB UR QL STRIP: NEGATIVE
BUN SERPL-MCNC: 13 MG/DL (ref 7–18)
BUN/CREAT SERPL: 15 (ref 6–20)
CALCIUM SERPL-MCNC: 9.8 MG/DL (ref 8.5–10.1)
CHLORIDE SERPL-SCNC: 105 MMOL/L (ref 98–107)
CLARITY UR: CLEAR
CO2 SERPL-SCNC: 25 MMOL/L (ref 21–32)
COLOR UR: NORMAL
CREAT SERPL-MCNC: 0.85 MG/DL (ref 0.55–1.02)
DIFFERENTIAL METHOD BLD: ABNORMAL
EGFR (NO RACE VARIABLE) (RUSH/TITUS): 82 ML/MIN/1.73M²
EOSINOPHIL # BLD AUTO: 0.14 K/UL (ref 0–0.5)
EOSINOPHIL NFR BLD AUTO: 1.7 % (ref 1–4)
ERYTHROCYTE [DISTWIDTH] IN BLOOD BY AUTOMATED COUNT: 14 % (ref 11.5–14.5)
GLOBULIN SER-MCNC: 3.7 G/DL (ref 2–4)
GLUCOSE SERPL-MCNC: 99 MG/DL (ref 74–106)
GLUCOSE UR STRIP-MCNC: NORMAL MG/DL
HCT VFR BLD AUTO: 44 % (ref 38–47)
HGB BLD-MCNC: 14.3 G/DL (ref 12–16)
IMM GRANULOCYTES # BLD AUTO: 0.07 K/UL (ref 0–0.04)
IMM GRANULOCYTES NFR BLD: 0.8 % (ref 0–0.4)
KETONES UR STRIP-SCNC: NEGATIVE MG/DL
LEUKOCYTE ESTERASE UR QL STRIP: NEGATIVE
LYMPHOCYTES # BLD AUTO: 0.95 K/UL (ref 1–4.8)
LYMPHOCYTES NFR BLD AUTO: 11.4 % (ref 27–41)
MCH RBC QN AUTO: 28.4 PG (ref 27–31)
MCHC RBC AUTO-ENTMCNC: 32.5 G/DL (ref 32–36)
MCV RBC AUTO: 87.3 FL (ref 80–96)
MONOCYTES # BLD AUTO: 0.43 K/UL (ref 0–0.8)
MONOCYTES NFR BLD AUTO: 5.2 % (ref 2–6)
MPC BLD CALC-MCNC: 10 FL (ref 9.4–12.4)
NEUTROPHILS # BLD AUTO: 6.7 K/UL (ref 1.8–7.7)
NEUTROPHILS NFR BLD AUTO: 80.7 % (ref 53–65)
NITRITE UR QL STRIP: NEGATIVE
NRBC # BLD AUTO: 0 X10E3/UL
NRBC, AUTO (.00): 0 %
PH UR STRIP: 6 PH UNITS
PLATELET # BLD AUTO: 270 K/UL (ref 150–400)
POTASSIUM SERPL-SCNC: 3.9 MMOL/L (ref 3.5–5.1)
PROT SERPL-MCNC: 7.4 G/DL (ref 6.4–8.2)
PROT UR QL STRIP: NEGATIVE
RBC # BLD AUTO: 5.04 M/UL (ref 4.2–5.4)
RBC # UR STRIP: NEGATIVE /UL
SODIUM SERPL-SCNC: 139 MMOL/L (ref 136–145)
SP GR UR STRIP: 1.02
TSH SERPL DL<=0.005 MIU/L-ACNC: 0.45 UIU/ML (ref 0.36–3.74)
UROBILINOGEN UR STRIP-ACNC: NORMAL MG/DL
WBC # BLD AUTO: 8.31 K/UL (ref 4.5–11)

## 2022-08-11 PROCEDURE — 1160F PR REVIEW ALL MEDS BY PRESCRIBER/CLIN PHARMACIST DOCUMENTED: ICD-10-PCS | Mod: ,,, | Performed by: FAMILY MEDICINE

## 2022-08-11 PROCEDURE — 81003 URINALYSIS, REFLEX TO URINE CULTURE: ICD-10-PCS | Mod: QW,,, | Performed by: CLINICAL MEDICAL LABORATORY

## 2022-08-11 PROCEDURE — 85025 COMPLETE CBC W/AUTO DIFF WBC: CPT | Mod: ,,, | Performed by: CLINICAL MEDICAL LABORATORY

## 2022-08-11 PROCEDURE — 96372 PR INJECTION,THERAP/PROPH/DIAG2ST, IM OR SUBCUT: ICD-10-PCS | Mod: ,,, | Performed by: FAMILY MEDICINE

## 2022-08-11 PROCEDURE — 3079F DIAST BP 80-89 MM HG: CPT | Mod: ,,, | Performed by: FAMILY MEDICINE

## 2022-08-11 PROCEDURE — 96372 THER/PROPH/DIAG INJ SC/IM: CPT | Mod: ,,, | Performed by: FAMILY MEDICINE

## 2022-08-11 PROCEDURE — 3075F SYST BP GE 130 - 139MM HG: CPT | Mod: ,,, | Performed by: FAMILY MEDICINE

## 2022-08-11 PROCEDURE — 80053 COMPREHENSIVE METABOLIC PANEL: ICD-10-PCS | Mod: ,,, | Performed by: CLINICAL MEDICAL LABORATORY

## 2022-08-11 PROCEDURE — 74019 RADEX ABDOMEN 2 VIEWS: CPT | Mod: TC

## 2022-08-11 PROCEDURE — 81003 URINALYSIS AUTO W/O SCOPE: CPT | Mod: QW,,, | Performed by: CLINICAL MEDICAL LABORATORY

## 2022-08-11 PROCEDURE — 1159F PR MEDICATION LIST DOCUMENTED IN MEDICAL RECORD: ICD-10-PCS | Mod: ,,, | Performed by: FAMILY MEDICINE

## 2022-08-11 PROCEDURE — 3079F PR MOST RECENT DIASTOLIC BLOOD PRESSURE 80-89 MM HG: ICD-10-PCS | Mod: ,,, | Performed by: FAMILY MEDICINE

## 2022-08-11 PROCEDURE — 99214 OFFICE O/P EST MOD 30 MIN: CPT | Mod: 25,,, | Performed by: FAMILY MEDICINE

## 2022-08-11 PROCEDURE — 80053 COMPREHEN METABOLIC PANEL: CPT | Mod: ,,, | Performed by: CLINICAL MEDICAL LABORATORY

## 2022-08-11 PROCEDURE — 84443 TSH: ICD-10-PCS | Mod: ,,, | Performed by: CLINICAL MEDICAL LABORATORY

## 2022-08-11 PROCEDURE — 3075F PR MOST RECENT SYSTOLIC BLOOD PRESS GE 130-139MM HG: ICD-10-PCS | Mod: ,,, | Performed by: FAMILY MEDICINE

## 2022-08-11 PROCEDURE — 85025 CBC WITH DIFFERENTIAL: ICD-10-PCS | Mod: ,,, | Performed by: CLINICAL MEDICAL LABORATORY

## 2022-08-11 PROCEDURE — 1160F RVW MEDS BY RX/DR IN RCRD: CPT | Mod: ,,, | Performed by: FAMILY MEDICINE

## 2022-08-11 PROCEDURE — 84443 ASSAY THYROID STIM HORMONE: CPT | Mod: ,,, | Performed by: CLINICAL MEDICAL LABORATORY

## 2022-08-11 PROCEDURE — 3008F PR BODY MASS INDEX (BMI) DOCUMENTED: ICD-10-PCS | Mod: ,,, | Performed by: FAMILY MEDICINE

## 2022-08-11 PROCEDURE — 1159F MED LIST DOCD IN RCRD: CPT | Mod: ,,, | Performed by: FAMILY MEDICINE

## 2022-08-11 PROCEDURE — 3008F BODY MASS INDEX DOCD: CPT | Mod: ,,, | Performed by: FAMILY MEDICINE

## 2022-08-11 PROCEDURE — 99214 PR OFFICE/OUTPT VISIT, EST, LEVL IV, 30-39 MIN: ICD-10-PCS | Mod: 25,,, | Performed by: FAMILY MEDICINE

## 2022-08-11 RX ORDER — ONDANSETRON 4 MG/1
4 TABLET, ORALLY DISINTEGRATING ORAL EVERY 6 HOURS PRN
Qty: 30 TABLET | Refills: 0 | Status: SHIPPED | OUTPATIENT
Start: 2022-08-11 | End: 2023-07-28

## 2022-08-11 RX ORDER — KETOROLAC TROMETHAMINE 30 MG/ML
60 INJECTION, SOLUTION INTRAMUSCULAR; INTRAVENOUS
Status: COMPLETED | OUTPATIENT
Start: 2022-08-11 | End: 2022-08-11

## 2022-08-11 RX ADMIN — KETOROLAC TROMETHAMINE 60 MG: 30 INJECTION, SOLUTION INTRAMUSCULAR; INTRAVENOUS at 03:08

## 2022-08-11 NOTE — PROGRESS NOTES
Chemo Trimble MD    [address] [place] [address]     PATIENT NAME: Shruthi Mcdermott  : 1970  DATE: 22  MRN: [id number]      Billing Provider: Chemo Trimble MD  Level of Service:   Patient PCP Information     Provider PCP Type    Iglesia Andres MD General          Reason for Visit / Chief Complaint: Nausea (C/o nausea, left sided abd pain and h/a since this morning)       Update PCP  Update Chief Complaint         History of Present Illness / Problem Focused Workflow     Shruthi Mcdermott presents to the clinic with Nausea (C/o nausea, left sided abd pain and h/a since this morning)     Started out as a headache, then developed left flank pain with nausea, the flank pain is sharp, not improved with anything or worsened with anything. Never felt pain like this prior to today        HPI    Review of Systems     Review of Systems   Constitutional: Negative for activity change, appetite change, chills, fatigue and fever.   HENT: Negative for nasal congestion, ear pain, hearing loss, postnasal drip and sore throat.    Respiratory: Negative for cough, chest tightness, shortness of breath and wheezing.    Cardiovascular: Negative for chest pain, palpitations, leg swelling and claudication.   Gastrointestinal: Positive for nausea and vomiting. Negative for abdominal pain, change in bowel habit, constipation, diarrhea and change in bowel habit.   Genitourinary: Negative for dysuria.   Musculoskeletal: Negative for arthralgias, back pain, gait problem and myalgias.   Integumentary:  Negative for rash.   Neurological: Negative for weakness and headaches.   Psychiatric/Behavioral: Negative for suicidal ideas. The patient is not nervous/anxious.         Medical / Social / Family History     Past Medical History:   Diagnosis Date    Acute superficial gastritis without hemorrhage 2021    Adenomatous polyp of ascending colon 1/10/2022     Adenomatous polyp of descending colon 1/10/2022    Adenomatous polyp of sigmoid colon 1/10/2022    Chronic headaches     Diverticula, colon 1/10/2022    Mental impairment     Schizophrenia     Screening for malignant neoplasm of colon 1/10/2022    Urinary retention 7/12/2021       Past Surgical History:   Procedure Laterality Date    ANKLE SURGERY Right     CHOLECYSTECTOMY      DIAGNOSTIC LAPAROSCOPY      HYSTERECTOMY      OOPHORECTOMY      REDUCTION OF BOTH BREASTS      TOTAL REDUCTION MAMMOPLASTY         Social History  Ms.  reports that she has never smoked. She has never used smokeless tobacco. She reports that she does not drink alcohol and does not use drugs.    Family History  Ms.'s family history includes Heart disease in her mother; Hypertension in her father.    Medications and Allergies     Medications  No outpatient medications have been marked as taking for the 8/11/22 encounter (Office Visit) with Chemo Trimble MD.     Current Facility-Administered Medications for the 8/11/22 encounter (Office Visit) with Chemo Trimble MD   Medication Dose Route Frequency Provider Last Rate Last Admin    [COMPLETED] ketorolac injection 60 mg  60 mg Intramuscular 1 time in Clinic/HOD Chemo Trimble MD   60 mg at 08/11/22 1550       Allergies  Review of patient's allergies indicates:  No Known Allergies    Physical Examination     Vitals:    08/11/22 1420   BP: 137/86   Pulse: 75   Resp: 18   Temp: 97.8 °F (36.6 °C)     Physical Exam  Vitals and nursing note reviewed.   Constitutional:       General: She is not in acute distress.     Appearance: Normal appearance. She is not ill-appearing.   Eyes:      Extraocular Movements: Extraocular movements intact.      Pupils: Pupils are equal, round, and reactive to light.   Cardiovascular:      Rate and Rhythm: Normal rate and regular rhythm.      Heart sounds: Normal heart sounds.   Pulmonary:      Effort: Pulmonary effort is normal.      Breath  sounds: Normal breath sounds.   Abdominal:      General: Bowel sounds are normal.      Palpations: Abdomen is soft.      Tenderness: There is abdominal tenderness in the left upper quadrant. There is no guarding or rebound.       Musculoskeletal:         General: Normal range of motion.   Skin:     Findings: No rash.   Neurological:      General: No focal deficit present.      Mental Status: She is alert and oriented to person, place, and time. Mental status is at baseline.   Psychiatric:         Mood and Affect: Mood normal.         Behavior: Behavior normal.          Assessment and Plan (including Health Maintenance)      Problem List  Smart Sets  Document Outside HM   :    Plan:         Health Maintenance Due   Topic Date Due    Hepatitis C Screening  Never done    Lipid Panel  Never done    TETANUS VACCINE  Never done       Problem List Items Addressed This Visit        GI    Constipation (Chronic)      Other Visit Diagnoses     Left upper quadrant abdominal pain    -  Primary    Relevant Orders    X-Ray Abdomen Flat And Erect (Completed)    Comprehensive Metabolic Panel    CBC Auto Differential    Urinalysis, Reflex to Urine Culture    Hyperglycemia        Acquired hypothyroidism  (Chronic)       Relevant Orders    TSH    Acute nonintractable headache, unspecified headache type        Relevant Medications    ketorolac injection 60 mg (Completed)    Nausea        Relevant Medications    ondansetron (ZOFRAN-ODT) 4 MG TbDL        Left upper quadrant abdominal pain  -     X-Ray Abdomen Flat And Erect; Future; Expected date: 08/11/2022  -     Comprehensive Metabolic Panel; Future; Expected date: 08/11/2022  -     CBC Auto Differential; Future; Expected date: 08/11/2022  -     Urinalysis, Reflex to Urine Culture; Future; Expected date: 08/11/2022    Hyperglycemia    Acquired hypothyroidism  -     TSH; Future; Expected date: 08/11/2022    Acute nonintractable headache, unspecified headache type  -     ketorolac  injection 60 mg    Nausea  -     ondansetron (ZOFRAN-ODT) 4 MG TbDL; Take 1 tablet (4 mg total) by mouth every 6 (six) hours as needed (for NAUSEA).  Dispense: 30 tablet; Refill: 0    Other constipation       Health Maintenance Topics with due status: Not Due       Topic Last Completion Date    Mammogram 11/30/2021    Colorectal Cancer Screening 01/10/2022    Influenza Vaccine Not Due       Procedures     Future Appointments   Date Time Provider Department Center   12/6/2022 10:00 AM [facility] [place] [facility] [place]        Follow up in 3 months (on 11/11/2022), or if symptoms worsen or fail to improve, for chronic health problems.     Signature:  Chemo Trimble MD  [address] [place] [address]  Phone #: [phone]  Fax #: [phone]    Date of encounter: 8/11/22    There are no Patient Instructions on file for this visit.

## 2022-12-06 ENCOUNTER — HOSPITAL ENCOUNTER (OUTPATIENT)
Dept: RADIOLOGY | Facility: HOSPITAL | Age: 52
Discharge: HOME | End: 2022-12-06
Payer: MEDICARE

## 2022-12-06 VITALS — WEIGHT: 179 LBS | BODY MASS INDEX: 30.73 KG/M2

## 2022-12-06 DIAGNOSIS — Z12.31 OTHER SCREENING MAMMOGRAM: ICD-10-CM

## 2022-12-06 PROCEDURE — 77067 SCR MAMMO BI INCL CAD: CPT | Mod: TC

## 2023-01-10 ENCOUNTER — HOSPITAL ENCOUNTER (OUTPATIENT)
Dept: RADIOLOGY | Facility: HOSPITAL | Age: 53
Discharge: HOME | End: 2023-01-10
Attending: RADIOLOGY
Payer: MEDICARE

## 2023-01-10 DIAGNOSIS — R92.8 ABNORMAL FINDING ON RADIOLOGICAL EXAMINATION OF BREAST: ICD-10-CM

## 2023-01-10 PROCEDURE — 77065 DX MAMMO INCL CAD UNI: CPT | Mod: TC,RT

## 2023-01-10 PROCEDURE — 76642 ULTRASOUND BREAST LIMITED: CPT | Mod: TC,RT

## 2023-02-06 DIAGNOSIS — E03.9 HYPOTHYROIDISM (ACQUIRED): ICD-10-CM

## 2023-02-06 RX ORDER — LEVOTHYROXINE SODIUM 100 UG/1
100 TABLET ORAL
Qty: 90 TABLET | Refills: 1 | Status: SHIPPED | OUTPATIENT
Start: 2023-02-06 | End: 2023-07-28 | Stop reason: SDUPTHER

## 2023-02-06 NOTE — TELEPHONE ENCOUNTER
Pt called requesting refills on her thyroid medication. She has not been seen since 2/11/22. I have attatched one month supply. Her last TSH was 12/1 by a different provider.

## 2023-06-21 ENCOUNTER — OFFICE VISIT (OUTPATIENT)
Dept: OBSTETRICS AND GYNECOLOGY | Facility: CLINIC | Age: 53
End: 2023-06-21
Payer: MEDICARE

## 2023-06-21 VITALS
HEIGHT: 64 IN | WEIGHT: 185 LBS | DIASTOLIC BLOOD PRESSURE: 95 MMHG | RESPIRATION RATE: 20 BRPM | SYSTOLIC BLOOD PRESSURE: 115 MMHG | BODY MASS INDEX: 31.58 KG/M2

## 2023-06-21 DIAGNOSIS — N81.11 CYSTOCELE, MIDLINE: Primary | ICD-10-CM

## 2023-06-21 PROCEDURE — 3008F BODY MASS INDEX DOCD: CPT | Mod: CPTII,,, | Performed by: OBSTETRICS & GYNECOLOGY

## 2023-06-21 PROCEDURE — 3074F SYST BP LT 130 MM HG: CPT | Mod: CPTII,,, | Performed by: OBSTETRICS & GYNECOLOGY

## 2023-06-21 PROCEDURE — 3008F PR BODY MASS INDEX (BMI) DOCUMENTED: ICD-10-PCS | Mod: CPTII,,, | Performed by: OBSTETRICS & GYNECOLOGY

## 2023-06-21 PROCEDURE — 3080F PR MOST RECENT DIASTOLIC BLOOD PRESSURE >= 90 MM HG: ICD-10-PCS | Mod: CPTII,,, | Performed by: OBSTETRICS & GYNECOLOGY

## 2023-06-21 PROCEDURE — 99212 PR OFFICE/OUTPT VISIT, EST, LEVL II, 10-19 MIN: ICD-10-PCS | Mod: S$PBB,,, | Performed by: OBSTETRICS & GYNECOLOGY

## 2023-06-21 PROCEDURE — 3074F PR MOST RECENT SYSTOLIC BLOOD PRESSURE < 130 MM HG: ICD-10-PCS | Mod: CPTII,,, | Performed by: OBSTETRICS & GYNECOLOGY

## 2023-06-21 PROCEDURE — 1159F PR MEDICATION LIST DOCUMENTED IN MEDICAL RECORD: ICD-10-PCS | Mod: CPTII,,, | Performed by: OBSTETRICS & GYNECOLOGY

## 2023-06-21 PROCEDURE — 3080F DIAST BP >= 90 MM HG: CPT | Mod: CPTII,,, | Performed by: OBSTETRICS & GYNECOLOGY

## 2023-06-21 PROCEDURE — 1159F MED LIST DOCD IN RCRD: CPT | Mod: CPTII,,, | Performed by: OBSTETRICS & GYNECOLOGY

## 2023-06-21 PROCEDURE — 99214 OFFICE O/P EST MOD 30 MIN: CPT | Mod: PBBFAC | Performed by: OBSTETRICS & GYNECOLOGY

## 2023-06-21 PROCEDURE — 99212 OFFICE O/P EST SF 10 MIN: CPT | Mod: S$PBB,,, | Performed by: OBSTETRICS & GYNECOLOGY

## 2023-06-21 NOTE — PATIENT INSTRUCTIONS
Discussed that she has minimal cystocele.      Do not recommend surgery at present.    No evidence of bladder infection.      Follow-up with me in 6 months she will notify me if any symptoms worsen.

## 2023-06-21 NOTE — PROGRESS NOTES
Subjective:       Patient ID: Shruthi Mcdermott is a 52 y.o. female.    Chief Complaint: Vaginal Prolapse      Presents for problem visit.    She is had previous hysterectomy.    She is concerned that she may have had some prolapse    Review of Systems      Objective:      Physical Exam  Genitourinary:     Comments: External genitalia normal to appearance.  Speculum exam revealed vaginal vault well-healed.      Examination of anterior and posterior aspects of the vaginal vault reveals a 1st to second-degree cystocele first-degree rectocele.  However she has good support at the urethral neck.  No obvious incontinence noted today with coughing.      She was examined in both supine and standing position.  However at most a 1st to second-degree cystocele noted.      Urethra was prepped with Betadine mini cath specimen was obtained urine grossly clear urine dipstick was negative therefore urine discarded.      Assessment:       1. Cystocele, midline        Plan:     Discussed that she has minimal cystocele.      Do not recommend surgery at present.    No evidence of bladder infection.      Follow-up with me in 6 months she will notify me if any symptoms worsen.    Occasional urinary incontinence however she does not have to wear pads on a daily basis.  Urine dipstick today was negative therefore culture not done.

## 2023-07-27 NOTE — TELEPHONE ENCOUNTER
Pt called requesting refill on her thyroid medication. Inst pt she has not seen Dr. Trimble since last August or had her thyroid level done. She has 9 tablets left. Inst her if she is going to run out before she can get in here to call me back. Verbalized understanding.

## 2023-07-28 ENCOUNTER — OFFICE VISIT (OUTPATIENT)
Dept: FAMILY MEDICINE | Facility: CLINIC | Age: 53
End: 2023-07-28
Payer: MEDICARE

## 2023-07-28 VITALS
TEMPERATURE: 99 F | SYSTOLIC BLOOD PRESSURE: 109 MMHG | OXYGEN SATURATION: 95 % | RESPIRATION RATE: 18 BRPM | BODY MASS INDEX: 31.72 KG/M2 | WEIGHT: 185.81 LBS | HEIGHT: 64 IN | HEART RATE: 73 BPM | DIASTOLIC BLOOD PRESSURE: 77 MMHG

## 2023-07-28 DIAGNOSIS — F20.81 SCHIZOPHRENIFORM DISORDER: ICD-10-CM

## 2023-07-28 DIAGNOSIS — E03.9 HYPOTHYROIDISM (ACQUIRED): Chronic | ICD-10-CM

## 2023-07-28 LAB — TSH SERPL DL<=0.005 MIU/L-ACNC: 0.47 UIU/ML (ref 0.36–3.74)

## 2023-07-28 PROCEDURE — 3078F DIAST BP <80 MM HG: CPT | Mod: ,,, | Performed by: FAMILY MEDICINE

## 2023-07-28 PROCEDURE — 3008F BODY MASS INDEX DOCD: CPT | Mod: ,,, | Performed by: FAMILY MEDICINE

## 2023-07-28 PROCEDURE — 1160F RVW MEDS BY RX/DR IN RCRD: CPT | Mod: ,,, | Performed by: FAMILY MEDICINE

## 2023-07-28 PROCEDURE — 3078F PR MOST RECENT DIASTOLIC BLOOD PRESSURE < 80 MM HG: ICD-10-PCS | Mod: ,,, | Performed by: FAMILY MEDICINE

## 2023-07-28 PROCEDURE — 1159F PR MEDICATION LIST DOCUMENTED IN MEDICAL RECORD: ICD-10-PCS | Mod: ,,, | Performed by: FAMILY MEDICINE

## 2023-07-28 PROCEDURE — 99213 OFFICE O/P EST LOW 20 MIN: CPT | Mod: ,,, | Performed by: FAMILY MEDICINE

## 2023-07-28 PROCEDURE — 84443 ASSAY THYROID STIM HORMONE: CPT | Mod: ,,, | Performed by: CLINICAL MEDICAL LABORATORY

## 2023-07-28 PROCEDURE — 3008F PR BODY MASS INDEX (BMI) DOCUMENTED: ICD-10-PCS | Mod: ,,, | Performed by: FAMILY MEDICINE

## 2023-07-28 PROCEDURE — 1159F MED LIST DOCD IN RCRD: CPT | Mod: ,,, | Performed by: FAMILY MEDICINE

## 2023-07-28 PROCEDURE — 99213 PR OFFICE/OUTPT VISIT, EST, LEVL III, 20-29 MIN: ICD-10-PCS | Mod: ,,, | Performed by: FAMILY MEDICINE

## 2023-07-28 PROCEDURE — 3074F SYST BP LT 130 MM HG: CPT | Mod: ,,, | Performed by: FAMILY MEDICINE

## 2023-07-28 PROCEDURE — 84443 TSH: ICD-10-PCS | Mod: ,,, | Performed by: CLINICAL MEDICAL LABORATORY

## 2023-07-28 PROCEDURE — 3074F PR MOST RECENT SYSTOLIC BLOOD PRESSURE < 130 MM HG: ICD-10-PCS | Mod: ,,, | Performed by: FAMILY MEDICINE

## 2023-07-28 PROCEDURE — 1160F PR REVIEW ALL MEDS BY PRESCRIBER/CLIN PHARMACIST DOCUMENTED: ICD-10-PCS | Mod: ,,, | Performed by: FAMILY MEDICINE

## 2023-07-28 RX ORDER — VILAZODONE HYDROCHLORIDE 10 MG/1
10 TABLET ORAL
COMMUNITY
Start: 2023-05-11 | End: 2024-02-12

## 2023-07-28 RX ORDER — QUETIAPINE FUMARATE 300 MG/1
TABLET, FILM COATED ORAL
COMMUNITY
Start: 2023-04-17 | End: 2024-02-12

## 2023-07-28 RX ORDER — LEVOTHYROXINE SODIUM 100 UG/1
100 TABLET ORAL
Qty: 90 TABLET | Refills: 1 | Status: SHIPPED | OUTPATIENT
Start: 2023-07-28 | End: 2024-01-25

## 2023-07-28 RX ORDER — SERTRALINE HCL 25 MG
25 TABLET ORAL
COMMUNITY
Start: 2023-06-08 | End: 2024-02-07

## 2023-07-28 NOTE — PROGRESS NOTES
Chemo Trimble MD    [address] [place] [address]     PATIENT NAME: Shruthi Mcdermott  : 1970  DATE: 23  MRN: [id number]      Billing Provider: Chemo Trimble MD  Level of Service: [place] OFFICE/OUTPT VISIT, EST, LEVL III, 20-29 MIN  Patient PCP Information       Provider PCP Type    Iglesia Andres MD General            Reason for Visit / Chief Complaint: Medication Refill (And she says she needs bloodwork for her thyroid) and Results (Pt says she had an u/s on her thyroid years ago (2021) and never heard results.  She would like to discuss.)       Update PCP  Update Chief Complaint         History of Present Illness / Problem Focused Workflow     Shruthi Mcdermott presents to the clinic with Medication Refill (And she says she needs bloodwork for her thyroid) and Results (Pt says she had an u/s on her thyroid years ago (2021) and never heard results.  She would like to discuss.)     She is doing well, no complaints     US from  showed multiple small cysts, nothing to be concerned about at that time.    Medication Refill  Pertinent negatives include no abdominal pain, arthralgias, change in bowel habit, chest pain, chills, congestion, coughing, fatigue, fever, headaches, myalgias, nausea, rash, sore throat, vomiting or weakness.     Review of Systems     Review of Systems   Constitutional:  Negative for activity change, appetite change, chills, fatigue and fever.   HENT:  Negative for nasal congestion, ear pain, hearing loss, postnasal drip and sore throat.    Respiratory:  Negative for cough, chest tightness, shortness of breath and wheezing.    Cardiovascular:  Negative for chest pain, palpitations, leg swelling and claudication.   Gastrointestinal:  Negative for abdominal pain, change in bowel habit, constipation, diarrhea, nausea, vomiting and change in bowel habit.   Genitourinary:  Negative for dysuria.   Musculoskeletal:   Negative for arthralgias, back pain, gait problem and myalgias.   Integumentary:  Negative for rash.   Neurological:  Negative for weakness and headaches.   Psychiatric/Behavioral:  Negative for suicidal ideas. The patient is not nervous/anxious.       Medical / Social / Family History     Past Medical History:   Diagnosis Date    Acute superficial gastritis without hemorrhage 12/20/2021    Adenomatous polyp of ascending colon 01/10/2022    Adenomatous polyp of descending colon 01/10/2022    Adenomatous polyp of sigmoid colon 01/10/2022    Chronic headaches     Depression     Diverticula, colon 01/10/2022    Mental impairment     Schizophrenia     Screening for malignant neoplasm of colon 01/10/2022    Urinary retention 07/12/2021       Past Surgical History:   Procedure Laterality Date    ANKLE SURGERY Right     CHOLECYSTECTOMY      DIAGNOSTIC LAPAROSCOPY      HYSTERECTOMY      OOPHORECTOMY      REDUCTION OF BOTH BREASTS      TOTAL REDUCTION MAMMOPLASTY         Social History  Ms.  reports that she has never smoked. She has never been exposed to tobacco smoke. She has never used smokeless tobacco. She reports that she does not drink alcohol and does not use drugs.    Family History  Ms.'s family history includes Heart disease in her mother; Hypertension in her father.    Medications and Allergies     Medications  Outpatient Medications Marked as Taking for the 7/28/23 encounter (Office Visit) with Chemo Trimble MD   Medication Sig Dispense Refill    ARIPiprazole (ABILIFY) 10 MG Tab Take 10 mg by mouth every morning.      benztropine (COGENTIN) 1 MG tablet Take 1 mg by mouth 2 (two) times daily.      COLACE 100 mg capsule Take 200 mg by mouth once daily.      DULoxetine (CYMBALTA) 60 MG capsule Take 60 mg by mouth once daily.      haloperidol decanoate (HALDOL DECANOATE) 100 mg/mL injection 100 mg every 21 days.      hydrOXYzine pamoate (VISTARIL) 50 MG Cap Take 50 mg by mouth nightly as needed.       mirtazapine (REMERON) 15 MG tablet Take 7.5 mg by mouth every evening.      propranoloL (INDERAL) 10 MG tablet Take 10 mg by mouth 2 (two) times daily.      ZOLOFT 25 mg tablet Take 25 mg by mouth.      [DISCONTINUED] levothyroxine (SYNTHROID) 100 MCG tablet Take 1 tablet (100 mcg total) by mouth before breakfast. For THYROID 90 tablet 1       Allergies  Review of patient's allergies indicates:  No Known Allergies    Physical Examination     Vitals:    07/28/23 0857   BP: 109/77   Pulse: 73   Resp: 18   Temp: 98.5 °F (36.9 °C)     Physical Exam  Vitals and nursing note reviewed.   Constitutional:       General: She is not in acute distress.     Appearance: Normal appearance. She is not ill-appearing.   Eyes:      Extraocular Movements: Extraocular movements intact.      Pupils: Pupils are equal, round, and reactive to light.   Cardiovascular:      Rate and Rhythm: Normal rate and regular rhythm.      Heart sounds: Normal heart sounds.   Pulmonary:      Effort: Pulmonary effort is normal.      Breath sounds: Normal breath sounds.   Abdominal:      General: Bowel sounds are normal.      Palpations: Abdomen is soft.   Musculoskeletal:         General: Normal range of motion.   Skin:     Findings: No rash.   Neurological:      General: No focal deficit present.      Mental Status: She is alert and oriented to person, place, and time. Mental status is at baseline.   Psychiatric:         Mood and Affect: Mood normal.         Behavior: Behavior normal.          Assessment and Plan (including Health Maintenance)      Problem List  Smart Sets  Document Outside HM   :    Plan:     Medications refilled and labs today.     Health Maintenance Due   Topic Date Due    Hepatitis C Screening  Never done    COVID-19 Vaccine (1) Never done    TETANUS VACCINE  Never done    Shingles Vaccine (1 of 2) Never done       Problem List Items Addressed This Visit          Psychiatric    Schizophrenia (Chronic)       Endocrine    Hypothyroidism  (acquired) (Chronic)    Relevant Medications    levothyroxine (SYNTHROID) 100 MCG tablet    Other Relevant Orders    TSH       Health Maintenance Topics with due status: Not Due       Topic Last Completion Date    Colorectal Cancer Screening 01/10/2022    Hemoglobin A1c (Diabetic Prevention Screening) 12/01/2022    Lipid Panel 12/01/2022    Mammogram 01/10/2023    Influenza Vaccine Not Due       Procedures     Future Appointments   Date Time Provider Department Center   12/12/2023 10:00 AM [facility] [place]   12/21/2023  1:00 PM Iglesia Andres MD [facility]        Follow up in about 6 months (around 1/28/2024) for chronic health problems.     Signature:  Chemo Trimble MD  [address] [place] [address]  Phone #: [phone]  Fax #: [phone]    Date of encounter: 7/28/23    There are no Patient Instructions on file for this visit.

## 2023-11-29 ENCOUNTER — OFFICE VISIT (OUTPATIENT)
Dept: FAMILY MEDICINE | Facility: CLINIC | Age: 53
End: 2023-11-29
Payer: MEDICARE

## 2023-11-29 VITALS
HEIGHT: 64 IN | BODY MASS INDEX: 31.58 KG/M2 | SYSTOLIC BLOOD PRESSURE: 112 MMHG | OXYGEN SATURATION: 97 % | DIASTOLIC BLOOD PRESSURE: 80 MMHG | HEART RATE: 69 BPM | WEIGHT: 185 LBS | TEMPERATURE: 98 F

## 2023-11-29 DIAGNOSIS — J06.9 VIRAL UPPER RESPIRATORY ILLNESS: Primary | ICD-10-CM

## 2023-11-29 DIAGNOSIS — R09.81 MILD NASAL CONGESTION: ICD-10-CM

## 2023-11-29 DIAGNOSIS — Z20.822 EXPOSURE TO COVID-19 VIRUS: ICD-10-CM

## 2023-11-29 LAB
CTP QC/QA: YES
CTP QC/QA: YES
FLUAV AG NPH QL: NEGATIVE
FLUBV AG NPH QL: NEGATIVE
SARS-COV-2 RDRP RESP QL NAA+PROBE: NEGATIVE

## 2023-11-29 PROCEDURE — 1160F RVW MEDS BY RX/DR IN RCRD: CPT | Mod: ,,, | Performed by: NURSE PRACTITIONER

## 2023-11-29 PROCEDURE — 3008F PR BODY MASS INDEX (BMI) DOCUMENTED: ICD-10-PCS | Mod: ,,, | Performed by: NURSE PRACTITIONER

## 2023-11-29 PROCEDURE — 87804 POCT INFLUENZA A/B: ICD-10-PCS | Mod: 59,QW,, | Performed by: NURSE PRACTITIONER

## 2023-11-29 PROCEDURE — 1159F MED LIST DOCD IN RCRD: CPT | Mod: ,,, | Performed by: NURSE PRACTITIONER

## 2023-11-29 PROCEDURE — 1160F PR REVIEW ALL MEDS BY PRESCRIBER/CLIN PHARMACIST DOCUMENTED: ICD-10-PCS | Mod: ,,, | Performed by: NURSE PRACTITIONER

## 2023-11-29 PROCEDURE — 3079F DIAST BP 80-89 MM HG: CPT | Mod: ,,, | Performed by: NURSE PRACTITIONER

## 2023-11-29 PROCEDURE — 1159F PR MEDICATION LIST DOCUMENTED IN MEDICAL RECORD: ICD-10-PCS | Mod: ,,, | Performed by: NURSE PRACTITIONER

## 2023-11-29 PROCEDURE — 3074F PR MOST RECENT SYSTOLIC BLOOD PRESSURE < 130 MM HG: ICD-10-PCS | Mod: ,,, | Performed by: NURSE PRACTITIONER

## 2023-11-29 PROCEDURE — 3008F BODY MASS INDEX DOCD: CPT | Mod: ,,, | Performed by: NURSE PRACTITIONER

## 2023-11-29 PROCEDURE — 99213 PR OFFICE/OUTPT VISIT, EST, LEVL III, 20-29 MIN: ICD-10-PCS | Mod: ,,, | Performed by: NURSE PRACTITIONER

## 2023-11-29 PROCEDURE — 87804 INFLUENZA ASSAY W/OPTIC: CPT | Mod: 59,QW,, | Performed by: NURSE PRACTITIONER

## 2023-11-29 PROCEDURE — 99213 OFFICE O/P EST LOW 20 MIN: CPT | Mod: ,,, | Performed by: NURSE PRACTITIONER

## 2023-11-29 PROCEDURE — 3079F PR MOST RECENT DIASTOLIC BLOOD PRESSURE 80-89 MM HG: ICD-10-PCS | Mod: ,,, | Performed by: NURSE PRACTITIONER

## 2023-11-29 PROCEDURE — 3074F SYST BP LT 130 MM HG: CPT | Mod: ,,, | Performed by: NURSE PRACTITIONER

## 2023-11-29 PROCEDURE — 87635 SARS-COV-2 COVID-19 AMP PRB: CPT | Mod: ,,, | Performed by: NURSE PRACTITIONER

## 2023-11-29 PROCEDURE — 87635: ICD-10-PCS | Mod: ,,, | Performed by: NURSE PRACTITIONER

## 2023-11-29 NOTE — PROGRESS NOTES
BUTCH Bundy    [address] [place] [address]     PATIENT NAME: Shruthi Mcdermott  : 1970  DATE: 23  MRN: [id number]      Billing Provider: BUTCH Bundy  Level of Service: MD OFFICE/OUTPT VISIT, EST, LEVL III, 20-29 MIN    ASSESSMENT AND PLAN:      Problem List Items Addressed This Visit    None  Visit Diagnoses       Viral upper respiratory illness    -  Primary    Relevant Orders    POCT COVID-19 Rapid Screening (Completed)    POCT Influenza A/B (Completed)    Mild nasal congestion        Relevant Orders    POCT COVID-19 Rapid Screening (Completed)    POCT Influenza A/B (Completed)    Exposure to COVID-19 virus        Relevant Orders    POCT COVID-19 Rapid Screening (Completed)    POCT Influenza A/B (Completed)            Rest/ hydrate  May administer tylenol (acetomenophen) or motrin (ibuprofen) for fever per dose for weight and age  If needed may alternate tylenol and motrin with three hours between doses. (ex. Tylenol given at 1 pm, motrin given at 4 pm, tylenol give at 7 pm, etc)  This allows 6 hours between motrin doses  Return in the morning for retest due to positive contact with COVID and FLU from live in family member    Future Appointments   Date Time Provider Department Center   2023 10:00 AM [facility] MAM[facility]1 [facility] [place]   2023  1:00 PM Iglesia Andres MD [facility] OBGYN [facility]      No follow-ups on file. or sooner as needed.      CHIEF COMPLAINT: Sinus Problem (Patient states for months she been dealing with sinus issues. Patient state it started off with a headache. Patient will like to be swabbed for COVID. She state if she negative for COVID she will like the flu vaccine. Pt state her sister had COVID. Patient state she has a lot of sinus pressure around eyes. Took tylenol before she left home. ), Nasal Congestion (Pt been dealing with nasal congestion for a month. She blowing out green  drainage. Pt state it has got worse. ), and Did not bring medication for review           HISTORY OF PRESENT ILLNESS:  Shruthi Mcdermott is a 53 y.o. female who presents to the clinic today     Shruthi Mcdermott presents to the clinic with Sinus Problem (Patient states for months she been dealing with sinus issues. Patient state it started off with a headache. Patient will like to be swabbed for COVID. She state if she negative for COVID she will like the flu vaccine. Pt state her sister had COVID. Patient state she has a lot of sinus pressure around eyes. Took tylenol before she left home. ), Nasal Congestion (Pt been dealing with nasal congestion for a month. She blowing out green drainage. Pt state it has got worse. ), and Did not bring medication for review     Here with acute onset cold symptoms this morning  Sister positive for COVID/FLU 11/24/2023            PAST MEDICAL HISTORY:      Past Medical History:   Diagnosis Date    Acute superficial gastritis without hemorrhage 12/20/2021    Adenomatous polyp of ascending colon 01/10/2022    Adenomatous polyp of descending colon 01/10/2022    Adenomatous polyp of sigmoid colon 01/10/2022    Chronic headaches     Depression     Diverticula, colon 01/10/2022    Mental impairment     Schizophrenia     Screening for malignant neoplasm of colon 01/10/2022    Urinary retention 07/12/2021     PAST SURGICAL HISTORY:  Past Surgical History:   Procedure Laterality Date    ANKLE SURGERY Right     CHOLECYSTECTOMY      DIAGNOSTIC LAPAROSCOPY      HYSTERECTOMY      OOPHORECTOMY      REDUCTION OF BOTH BREASTS      TOTAL REDUCTION MAMMOPLASTY       SOCIAL HISTORY:  Social History     Socioeconomic History    Marital status: Single   Tobacco Use    Smoking status: Never     Passive exposure: Never    Smokeless tobacco: Never   Substance and Sexual Activity    Alcohol use: Never    Drug use: Never    Sexual activity: Not Currently     FAMILY HISTORY:       Family History    Problem Relation Age of Onset    Heart disease Mother     Hypertension Father     Vision loss Neg Hx        ALLERGIES AND MEDICATIONS: updated and reviewed.       Review of patient's allergies indicates:  No Known Allergies  Medication List with Changes/Refills   Current Medications    ARIPIPRAZOLE (ABILIFY) 10 MG TAB    Take 10 mg by mouth every morning.    BENZTROPINE (COGENTIN) 1 MG TABLET    Take 1 mg by mouth 2 (two) times daily.    COLACE 100 MG CAPSULE    Take 200 mg by mouth once daily.    DULOXETINE (CYMBALTA) 60 MG CAPSULE    Take 60 mg by mouth once daily.    HALOPERIDOL DECANOATE (HALDOL DECANOATE) 100 MG/ML INJECTION    100 mg every 21 days.    HYDROXYZINE PAMOATE (VISTARIL) 50 MG CAP    Take 50 mg by mouth nightly as needed.    LEVOTHYROXINE (SYNTHROID) 100 MCG TABLET    Take 1 tablet (100 mcg total) by mouth before breakfast. For THYROID    MIRTAZAPINE (REMERON) 15 MG TABLET    Take 7.5 mg by mouth every evening.    PROPRANOLOL (INDERAL) 10 MG TABLET    Take 10 mg by mouth 2 (two) times daily.    QUETIAPINE (SEROQUEL) 300 MG TAB        QUETIAPINE (SEROQUEL) 400 MG TABLET        VIIBRYD 10 MG TAB TABLET    Take 10 mg by mouth.    ZOLOFT 25 MG TABLET    Take 25 mg by mouth.       SCREENING HISTORY:     Health Maintenance         Date Due Completion Date    Hepatitis C Screening Never done ---    COVID-19 Vaccine (1) Never done ---    TETANUS VACCINE Never done ---    Shingles Vaccine (1 of 2) Never done ---    Influenza Vaccine (1) Never done ---    Mammogram 01/10/2024 1/10/2023    HIV Screening 02/11/2028 (Originally 7/17/1984) ---    Colorectal Cancer Screening 01/10/2025 1/10/2022    Hemoglobin A1c (Diabetic Prevention Screening) 12/01/2025 12/1/2022    Lipid Panel 12/01/2027 12/1/2022            REVIEW OF SYSTEMS:   Review of Systems   HENT:  Positive for postnasal drip and sinus pressure/congestion.    Respiratory: Negative.     Cardiovascular: Negative.    Gastrointestinal: Negative.   "        PHYSICAL EXAM:         /80 (BP Location: Left arm, Patient Position: Sitting)   Pulse 69   Temp 97.7 °F (36.5 °C) (Oral)   Ht 5' 4" (1.626 m)   Wt 83.9 kg (185 lb)   SpO2 97%   BMI 31.76 kg/m²  No LMP recorded. Patient has had a hysterectomy.  Wt Readings from Last 3 Encounters:   11/29/23 83.9 kg (185 lb)   07/28/23 84.3 kg (185 lb 12.8 oz)   06/21/23 83.9 kg (185 lb)     BP Readings from Last 3 Encounters:   11/29/23 112/80   07/28/23 109/77   06/21/23 (!) 115/95     Estimated body mass index is 31.76 kg/m² as calculated from the following:    Height as of this encounter: 5' 4" (1.626 m).    Weight as of this encounter: 83.9 kg (185 lb).     Physical Exam  Constitutional:       Appearance: She is obese.   Cardiovascular:      Rate and Rhythm: Normal rate and regular rhythm.      Pulses: Normal pulses.      Heart sounds: Murmur heard.   Pulmonary:      Effort: Pulmonary effort is normal.      Breath sounds: Normal breath sounds.   Musculoskeletal:      Cervical back: Neck supple.   Neurological:      Mental Status: She is alert and oriented to person, place, and time.   Psychiatric:         Mood and Affect: Mood normal.         Behavior: Behavior normal.         LABS:     I have reviewed old labs below:  Lab Results   Component Value Date    WBC 6.26 12/01/2022    HGB 13.7 12/01/2022    HCT 43.7 12/01/2022    MCV 88.6 12/01/2022     12/01/2022     12/01/2022    K 4.0 12/01/2022     (H) 12/01/2022    CALCIUM 9.2 12/01/2022    CO2 30 12/01/2022     (H) 12/01/2022    BUN 10 12/01/2022    CREATININE 0.82 12/01/2022    ANIONGAP 7 12/01/2022    EGFRNONAA 79 06/27/2021    PROT 7.4 08/11/2022    ALBUMIN 3.7 08/11/2022    BILITOT 0.4 08/11/2022    ALKPHOS 139 (H) 08/11/2022    ALT 21 08/11/2022    AST 19 08/11/2022    CHOL 195 12/01/2022    TRIG 189 (H) 12/01/2022    HDL 45 12/01/2022    LDLCALC 112 12/01/2022    TSH 0.472 07/28/2023    HGBA1C 6.2 12/01/2022           Signature: "  Lena Ruvalcaba, [address] [place] [address]  Phone #: [phone]  Fax #: [phone]       Date of encounter: 11/29/23    Patient Instructions   Rest/ hydrate  May administer tylenol (acetomenophen) or motrin (ibuprofen) for fever per dose for weight and age  If needed may alternate tylenol and motrin with three hours between doses. (ex. Tylenol given at 1 pm, motrin given at 4 pm, tylenol give at 7 pm, etc)  This allows 6 hours between motrin doses  Return in the morning for retest due to positive contact with COVID and FLU from live in family member

## 2023-11-29 NOTE — PATIENT INSTRUCTIONS
Rest/ hydrate  May administer tylenol (acetomenophen) or motrin (ibuprofen) for fever per dose for weight and age  If needed may alternate tylenol and motrin with three hours between doses. (ex. Tylenol given at 1 pm, motrin given at 4 pm, tylenol give at 7 pm, etc)  This allows 6 hours between motrin doses  Return in the morning for retest due to positive contact with COVID and FLU from live in family member

## 2023-11-30 ENCOUNTER — OFFICE VISIT (OUTPATIENT)
Dept: FAMILY MEDICINE | Facility: CLINIC | Age: 53
End: 2023-11-30
Payer: MEDICARE

## 2023-11-30 VITALS
SYSTOLIC BLOOD PRESSURE: 108 MMHG | HEART RATE: 71 BPM | HEIGHT: 64 IN | WEIGHT: 183 LBS | BODY MASS INDEX: 31.24 KG/M2 | TEMPERATURE: 98 F | DIASTOLIC BLOOD PRESSURE: 77 MMHG | OXYGEN SATURATION: 96 %

## 2023-11-30 DIAGNOSIS — R53.83 OTHER FATIGUE: ICD-10-CM

## 2023-11-30 DIAGNOSIS — R51.9 SINUS HEADACHE: Primary | ICD-10-CM

## 2023-11-30 LAB
CTP QC/QA: YES
CTP QC/QA: YES
FLUAV AG NPH QL: NEGATIVE
FLUBV AG NPH QL: NEGATIVE
SARS-COV-2 AG RESP QL IA.RAPID: NEGATIVE

## 2023-11-30 PROCEDURE — 87426 SARSCOV CORONAVIRUS AG IA: CPT | Mod: QW,,, | Performed by: NURSE PRACTITIONER

## 2023-11-30 PROCEDURE — 87426 SARS CORONAVIRUS 2 ANTIGEN POCT: ICD-10-PCS | Mod: QW,,, | Performed by: NURSE PRACTITIONER

## 2023-11-30 PROCEDURE — 3078F DIAST BP <80 MM HG: CPT | Mod: ,,, | Performed by: NURSE PRACTITIONER

## 2023-11-30 PROCEDURE — 3008F PR BODY MASS INDEX (BMI) DOCUMENTED: ICD-10-PCS | Mod: ,,, | Performed by: NURSE PRACTITIONER

## 2023-11-30 PROCEDURE — 96372 THER/PROPH/DIAG INJ SC/IM: CPT | Mod: ,,, | Performed by: NURSE PRACTITIONER

## 2023-11-30 PROCEDURE — 87804 INFLUENZA ASSAY W/OPTIC: CPT | Mod: QW,,, | Performed by: NURSE PRACTITIONER

## 2023-11-30 PROCEDURE — 3074F SYST BP LT 130 MM HG: CPT | Mod: ,,, | Performed by: NURSE PRACTITIONER

## 2023-11-30 PROCEDURE — 99213 PR OFFICE/OUTPT VISIT, EST, LEVL III, 20-29 MIN: ICD-10-PCS | Mod: 25,,, | Performed by: NURSE PRACTITIONER

## 2023-11-30 PROCEDURE — 3078F PR MOST RECENT DIASTOLIC BLOOD PRESSURE < 80 MM HG: ICD-10-PCS | Mod: ,,, | Performed by: NURSE PRACTITIONER

## 2023-11-30 PROCEDURE — 3074F PR MOST RECENT SYSTOLIC BLOOD PRESSURE < 130 MM HG: ICD-10-PCS | Mod: ,,, | Performed by: NURSE PRACTITIONER

## 2023-11-30 PROCEDURE — 1159F PR MEDICATION LIST DOCUMENTED IN MEDICAL RECORD: ICD-10-PCS | Mod: ,,, | Performed by: NURSE PRACTITIONER

## 2023-11-30 PROCEDURE — 87804 POCT INFLUENZA A/B: ICD-10-PCS | Mod: QW,,, | Performed by: NURSE PRACTITIONER

## 2023-11-30 PROCEDURE — 96372 PR INJECTION,THERAP/PROPH/DIAG2ST, IM OR SUBCUT: ICD-10-PCS | Mod: ,,, | Performed by: NURSE PRACTITIONER

## 2023-11-30 PROCEDURE — 99213 OFFICE O/P EST LOW 20 MIN: CPT | Mod: 25,,, | Performed by: NURSE PRACTITIONER

## 2023-11-30 PROCEDURE — 1159F MED LIST DOCD IN RCRD: CPT | Mod: ,,, | Performed by: NURSE PRACTITIONER

## 2023-11-30 PROCEDURE — 3008F BODY MASS INDEX DOCD: CPT | Mod: ,,, | Performed by: NURSE PRACTITIONER

## 2023-11-30 RX ORDER — DEXAMETHASONE SODIUM PHOSPHATE 4 MG/ML
4 INJECTION, SOLUTION INTRA-ARTICULAR; INTRALESIONAL; INTRAMUSCULAR; INTRAVENOUS; SOFT TISSUE
Status: COMPLETED | OUTPATIENT
Start: 2023-11-30 | End: 2023-11-30

## 2023-11-30 RX ADMIN — DEXAMETHASONE SODIUM PHOSPHATE 4 MG: 4 INJECTION, SOLUTION INTRA-ARTICULAR; INTRALESIONAL; INTRAMUSCULAR; INTRAVENOUS; SOFT TISSUE at 08:11

## 2023-11-30 NOTE — PROGRESS NOTES
BUTCH Bundy    [address] [place] [address]     PATIENT NAME: Shruthi Mcdermott  : 1970  DATE: 23  MRN: [id number]      Billing Provider: BUTCH Bundy  Level of Service:     ASSESSMENT AND PLAN:      Problem List Items Addressed This Visit    None  Visit Diagnoses       Sinus headache    -  Primary    Relevant Medications    dexAMETHasone injection 4 mg (Start on 2023  8:45 AM)    Other Relevant Orders    SARS Coronavirus 2 Antigen, POCT    Other fatigue        Relevant Orders    POCT Influenza A/B Rapid Antigen        Blow nose often  Stay well hydrated with water being best  Use [place] pot or Saline nasal wash often to help remove nasal secretion and reduce swelling of nares  Complete oral antibiotics if ordered  If not improving after 4 days, return to clinic for recheck  Tylenol Severe Sinus or Tylenol cold and sinus over the counter medication to help with sinus pressure  Reassurance today with negative covid/flu  Decadron 4 mg Im today    Health Maintenance Topics with due status: Not Due       Topic Last Completion Date    Colorectal Cancer Screening 01/10/2022    Hemoglobin A1c (Diabetic Prevention Screening) 2022    Lipid Panel 2022     Future Appointments   Date Time Provider Department Center   2023 10:00 AM [facility]1 [facility] [place]   2023  1:00 PM Iglesia Andres MD [facility] [place]      No follow-ups on file. or sooner as needed.      CHIEF COMPLAINT: Headache (Pt is here to get restested for covid flu. She states this morning she had a headache and lots of head pressure. )           HISTORY OF PRESENT ILLNESS:  Shruthi Mcdermott is a 53 y.o. female who presents to the clinic today     Shruthi Mcdermott presents to the clinic with Headache (Pt is here to get restested for covid flu. She states this morning she had a headache and lots of head pressure. )      Patient resides with sibling/ sibling positive for covid and FLU B on 11/24/2023  Patient presented 11/29/2023 with acute onset sinus pressure and mild headache- tested negative for covid/flu   Here for retest per provider request  Reports today increased sinus pressure and headache  Remains Afebrile      Headache   This is a new problem. The current episode started yesterday. The problem occurs intermittently. The problem has been unchanged. The pain is located in the Frontal region. The pain does not radiate. The pain quality is similar to prior headaches. The quality of the pain is described as dull and aching. The pain is at a severity of 5/10. The pain is moderate. Associated symptoms include sinus pressure. Nothing aggravates the symptoms. She has tried nothing for the symptoms. The treatment provided no relief. Her past medical history is significant for hypertension, obesity and sinus disease.       PAST MEDICAL HISTORY:      Past Medical History:   Diagnosis Date    Acute superficial gastritis without hemorrhage 12/20/2021    Adenomatous polyp of ascending colon 01/10/2022    Adenomatous polyp of descending colon 01/10/2022    Adenomatous polyp of sigmoid colon 01/10/2022    Chronic headaches     Depression     Diverticula, colon 01/10/2022    Mental impairment     Schizophrenia     Screening for malignant neoplasm of colon 01/10/2022    Urinary retention 07/12/2021     PAST SURGICAL HISTORY:  Past Surgical History:   Procedure Laterality Date    ANKLE SURGERY Right     CHOLECYSTECTOMY      DIAGNOSTIC LAPAROSCOPY      HYSTERECTOMY      OOPHORECTOMY      REDUCTION OF BOTH BREASTS      TOTAL REDUCTION MAMMOPLASTY       SOCIAL HISTORY:  Social History     Socioeconomic History    Marital status: Single   Tobacco Use    Smoking status: Never     Passive exposure: Never    Smokeless tobacco: Never   Substance and Sexual Activity    Alcohol use: Never    Drug use: Never    Sexual activity: Not Currently      FAMILY HISTORY:       Family History   Problem Relation Age of Onset    Heart disease Mother     Hypertension Father     Vision loss Neg Hx        ALLERGIES AND MEDICATIONS: updated and reviewed.       Review of patient's allergies indicates:  No Known Allergies  Medication List with Changes/Refills   Current Medications    ARIPIPRAZOLE (ABILIFY) 10 MG TAB    Take 10 mg by mouth every morning.    BENZTROPINE (COGENTIN) 1 MG TABLET    Take 1 mg by mouth 2 (two) times daily.    COLACE 100 MG CAPSULE    Take 200 mg by mouth once daily.    DULOXETINE (CYMBALTA) 60 MG CAPSULE    Take 60 mg by mouth once daily.    HALOPERIDOL DECANOATE (HALDOL DECANOATE) 100 MG/ML INJECTION    100 mg every 21 days.    HYDROXYZINE PAMOATE (VISTARIL) 50 MG CAP    Take 50 mg by mouth nightly as needed.    LEVOTHYROXINE (SYNTHROID) 100 MCG TABLET    Take 1 tablet (100 mcg total) by mouth before breakfast. For THYROID    MIRTAZAPINE (REMERON) 15 MG TABLET    Take 7.5 mg by mouth every evening.    PROPRANOLOL (INDERAL) 10 MG TABLET    Take 10 mg by mouth 2 (two) times daily.    QUETIAPINE (SEROQUEL) 300 MG TAB        QUETIAPINE (SEROQUEL) 400 MG TABLET        VIIBRYD 10 MG TAB TABLET    Take 10 mg by mouth.    ZOLOFT 25 MG TABLET    Take 25 mg by mouth.       SCREENING HISTORY:     Health Maintenance         Date Due Completion Date    Hepatitis C Screening Never done ---    COVID-19 Vaccine (1) Never done ---    TETANUS VACCINE Never done ---    Shingles Vaccine (1 of 2) Never done ---    Influenza Vaccine (1) Never done ---    Mammogram 01/10/2024 1/10/2023    HIV Screening 02/11/2028 (Originally 7/17/1984) ---    Colorectal Cancer Screening 01/10/2025 1/10/2022    Hemoglobin A1c (Diabetic Prevention Screening) 12/01/2025 12/1/2022    Lipid Panel 12/01/2027 12/1/2022            REVIEW OF SYSTEMS:   Review of Systems   Constitutional: Negative.    HENT:  Positive for sinus pressure/congestion.    Respiratory: Negative.     Cardiovascular:  "Negative.    Neurological:  Positive for headaches.         PHYSICAL EXAM:         /77 (BP Location: Left arm, Patient Position: Sitting, BP Method: Large (Automatic))   Pulse 71   Temp 97.9 °F (36.6 °C) (Oral)   Ht 5' 4" (1.626 m)   Wt 83 kg (183 lb)   SpO2 96%   BMI 31.41 kg/m²  No LMP recorded. Patient has had a hysterectomy.  Wt Readings from Last 3 Encounters:   11/30/23 83 kg (183 lb)   11/29/23 83.9 kg (185 lb)   07/28/23 84.3 kg (185 lb 12.8 oz)     BP Readings from Last 3 Encounters:   11/30/23 108/77   11/29/23 112/80   07/28/23 109/77     Estimated body mass index is 31.41 kg/m² as calculated from the following:    Height as of this encounter: 5' 4" (1.626 m).    Weight as of this encounter: 83 kg (183 lb).     Physical Exam  HENT:      Head: Normocephalic.      Right Ear: A middle ear effusion is present. Tympanic membrane is bulging.      Left Ear: A middle ear effusion is present.      Nose:      Right Turbinates: Swollen.      Left Turbinates: Swollen.      Right Sinus: Frontal sinus tenderness present.      Left Sinus: Frontal sinus tenderness present.      Mouth/Throat:      Lips: Pink.   Cardiovascular:      Rate and Rhythm: Normal rate and regular rhythm.      Pulses: Normal pulses.      Heart sounds: Normal heart sounds.   Pulmonary:      Effort: Pulmonary effort is normal.      Breath sounds: Normal breath sounds.   Musculoskeletal:      Cervical back: Neck supple.   Neurological:      Mental Status: She is alert.         LABS:     I have reviewed old labs below:  Lab Results   Component Value Date    WBC 6.26 12/01/2022    HGB 13.7 12/01/2022    HCT 43.7 12/01/2022    MCV 88.6 12/01/2022     12/01/2022     12/01/2022    K 4.0 12/01/2022     (H) 12/01/2022    CALCIUM 9.2 12/01/2022    CO2 30 12/01/2022     (H) 12/01/2022    BUN 10 12/01/2022    CREATININE 0.82 12/01/2022    ANIONGAP 7 12/01/2022    EGFRNONAA 79 06/27/2021    PROT 7.4 08/11/2022    ALBUMIN " 3.7 08/11/2022    BILITOT 0.4 08/11/2022    ALKPHOS 139 (H) 08/11/2022    ALT 21 08/11/2022    AST 19 08/11/2022    CHOL 195 12/01/2022    TRIG 189 (H) 12/01/2022    HDL 45 12/01/2022    LDLCALC 112 12/01/2022    TSH 0.472 07/28/2023    HGBA1C 6.2 12/01/2022           Signature:  BUTCH Bundy  [address] [place] [address]  Phone #: [phone]  Fax #: [phone]       Date of encounter: 11/30/23    Patient Instructions   Blow nose often  Stay well hydrated with water being best  Use [place] pot or Saline nasal wash often to help remove nasal secretion and reduce swelling of nares  Complete oral antibiotics if ordered  If not improving after 4 days, return to clinic for recheck  Tylenol Severe Sinus or Tylenol cold and sinus over the counter medication to help with sinus pressure  Reassurance today with negative covid/flu

## 2023-11-30 NOTE — PATIENT INSTRUCTIONS
Blow nose often  Stay well hydrated with water being best  Use [place] pot or Saline nasal wash often to help remove nasal secretion and reduce swelling of nares  Complete oral antibiotics if ordered  If not improving after 4 days, return to clinic for recheck  Tylenol Severe Sinus or Tylenol cold and sinus over the counter medication to help with sinus pressure  Reassurance today with negative covid/flu

## 2023-12-09 DIAGNOSIS — Z71.89 COMPLEX CARE COORDINATION: ICD-10-CM

## 2023-12-21 ENCOUNTER — OFFICE VISIT (OUTPATIENT)
Dept: OBSTETRICS AND GYNECOLOGY | Facility: CLINIC | Age: 53
End: 2023-12-21
Payer: MEDICARE

## 2023-12-21 VITALS — DIASTOLIC BLOOD PRESSURE: 70 MMHG | SYSTOLIC BLOOD PRESSURE: 110 MMHG

## 2023-12-21 DIAGNOSIS — N81.11 CYSTOCELE, MIDLINE: Primary | ICD-10-CM

## 2023-12-21 PROCEDURE — 99212 PR OFFICE/OUTPT VISIT, EST, LEVL II, 10-19 MIN: ICD-10-PCS | Mod: S$PBB,,, | Performed by: OBSTETRICS & GYNECOLOGY

## 2023-12-21 PROCEDURE — 99212 OFFICE O/P EST SF 10 MIN: CPT | Mod: S$PBB,,, | Performed by: OBSTETRICS & GYNECOLOGY

## 2023-12-21 PROCEDURE — 3074F PR MOST RECENT SYSTOLIC BLOOD PRESSURE < 130 MM HG: ICD-10-PCS | Mod: CPTII,,, | Performed by: OBSTETRICS & GYNECOLOGY

## 2023-12-21 PROCEDURE — 3078F DIAST BP <80 MM HG: CPT | Mod: CPTII,,, | Performed by: OBSTETRICS & GYNECOLOGY

## 2023-12-21 PROCEDURE — 3074F SYST BP LT 130 MM HG: CPT | Mod: CPTII,,, | Performed by: OBSTETRICS & GYNECOLOGY

## 2023-12-21 PROCEDURE — 99213 OFFICE O/P EST LOW 20 MIN: CPT | Mod: PBBFAC | Performed by: OBSTETRICS & GYNECOLOGY

## 2023-12-21 PROCEDURE — 3078F PR MOST RECENT DIASTOLIC BLOOD PRESSURE < 80 MM HG: ICD-10-PCS | Mod: CPTII,,, | Performed by: OBSTETRICS & GYNECOLOGY

## 2023-12-21 NOTE — PROGRESS NOTES
Subjective:       Patient ID: Shruthi Mcdermott is a 53 y.o. female.    Chief Complaint: Follow-up (Here for 6 month f/u-hx of cystocele.  Pt is c/o pain in back of head, has been ongoing for years, getting worse, she took 2 tylenol today.  Mom is with her and states she is having some depression also. )    Presents for follow-up as directed.      Previously June of 2023 she presented with question of pelvic prolapse.  She had had previous hysterectomy.  On examination she had had most a second-degree cystocele first-degree rectocele.      She states voiding normal at present.  No incontinence.    Follow-up    Review of Systems      Objective:      Physical Exam  Genitourinary:     Comments: On repeat examination today with speculum and using 1/2 of speculum the anterior and posterior aspects of the vaginal vault were examined.  1st to at most second-degree cystocele with good support at the urethral neck.  No incontinence noted with coughing.  At most first-degree rectocele.            Assessment:       1. Cystocele, midline        Plan:       Patient Instructions   Discussed with patient and sister that minimal cystocele and rectocele noted.  Essentially asymptomatic at present.  Bimanual examination was unremarkable no pelvic masses noted.    She will continue yearly screening mammography repeat mammography scheduled in January or February 2024.      She has had previous colonoscopy screening.  2022.    Recheck yearly or p.r.n. if problems arise.

## 2023-12-21 NOTE — PATIENT INSTRUCTIONS
Discussed with patient and sister that minimal cystocele and rectocele noted.  Essentially asymptomatic at present.  Bimanual examination was unremarkable no pelvic masses noted.    She will continue yearly screening mammography repeat mammography scheduled in January or February 2024.      She has had previous colonoscopy screening.  2022.    Recheck yearly or p.r.n. if problems arise.

## 2024-01-09 ENCOUNTER — OFFICE VISIT (OUTPATIENT)
Dept: FAMILY MEDICINE | Facility: CLINIC | Age: 54
End: 2024-01-09
Payer: MEDICARE

## 2024-01-09 VITALS
OXYGEN SATURATION: 97 % | SYSTOLIC BLOOD PRESSURE: 115 MMHG | WEIGHT: 171.38 LBS | HEART RATE: 74 BPM | RESPIRATION RATE: 18 BRPM | BODY MASS INDEX: 29.26 KG/M2 | DIASTOLIC BLOOD PRESSURE: 82 MMHG | HEIGHT: 64 IN | TEMPERATURE: 99 F

## 2024-01-09 DIAGNOSIS — Z20.822 ENCOUNTER FOR LABORATORY TESTING FOR COVID-19 VIRUS: ICD-10-CM

## 2024-01-09 DIAGNOSIS — R05.9 COUGH, UNSPECIFIED TYPE: ICD-10-CM

## 2024-01-09 DIAGNOSIS — J30.9 ALLERGIC RHINITIS, UNSPECIFIED SEASONALITY, UNSPECIFIED TRIGGER: Primary | ICD-10-CM

## 2024-01-09 PROCEDURE — 1159F MED LIST DOCD IN RCRD: CPT | Mod: ,,, | Performed by: NURSE PRACTITIONER

## 2024-01-09 PROCEDURE — 1160F RVW MEDS BY RX/DR IN RCRD: CPT | Mod: ,,, | Performed by: NURSE PRACTITIONER

## 2024-01-09 PROCEDURE — 3079F DIAST BP 80-89 MM HG: CPT | Mod: ,,, | Performed by: NURSE PRACTITIONER

## 2024-01-09 PROCEDURE — 3008F BODY MASS INDEX DOCD: CPT | Mod: ,,, | Performed by: NURSE PRACTITIONER

## 2024-01-09 PROCEDURE — 87635 SARS-COV-2 COVID-19 AMP PRB: CPT | Mod: QW,,, | Performed by: NURSE PRACTITIONER

## 2024-01-09 PROCEDURE — 3074F SYST BP LT 130 MM HG: CPT | Mod: ,,, | Performed by: NURSE PRACTITIONER

## 2024-01-09 PROCEDURE — 87804 INFLUENZA ASSAY W/OPTIC: CPT | Mod: QW,,, | Performed by: NURSE PRACTITIONER

## 2024-01-09 PROCEDURE — 99214 OFFICE O/P EST MOD 30 MIN: CPT | Mod: ,,, | Performed by: NURSE PRACTITIONER

## 2024-01-09 RX ORDER — FLUTICASONE PROPIONATE 50 MCG
2 SPRAY, SUSPENSION (ML) NASAL DAILY
Qty: 16 ML | Refills: 0 | Status: SHIPPED | OUTPATIENT
Start: 2024-01-09

## 2024-01-09 RX ORDER — BREXPIPRAZOLE 2 MG/1
1 TABLET ORAL
COMMUNITY
Start: 2024-01-08 | End: 2024-02-07

## 2024-01-09 RX ORDER — AMOXICILLIN 500 MG/1
500 TABLET, FILM COATED ORAL EVERY 12 HOURS
COMMUNITY
Start: 2024-01-06 | End: 2024-02-12

## 2024-01-09 NOTE — PROGRESS NOTES
Yaritza Garsia DNP, BUTCH    [address] [place] [address]     PATIENT NAME: Shruthi Mcdermott  : 1970  DATE: 24  MRN: [id number]      Billing Provider: Yaritza Garsia DNP, FNP  Level of Service:   Patient PCP Information       Provider PCP Type    Chemo Trimble MD General            Reason for Visit / Chief Complaint: Nasal Congestion (Pt says her symptoms started Friday. She went to [facility] and was told she had strep. She has been taking her abx prescribed but does not feel better.  She wants to be restested.), Cough, and Sore Throat       Update PCP  Update Chief Complaint         History of Present Illness / Problem Focused Workflow     Shruthi Mcdermott presents to the clinic with Nasal Congestion (Pt says her symptoms started Friday. She went to [facility] and was told she had strep. She has been taking her abx prescribed but does not feel better.  She wants to be restested.), Cough, and Sore Throat     Cough  Associated symptoms include a sore throat. Pertinent negatives include no chest pain, chills, ear pain, fever, headaches, myalgias, postnasal drip, rash, shortness of breath or wheezing.   Sore Throat   Associated symptoms include coughing. Pertinent negatives include no abdominal pain, congestion, diarrhea, ear pain, headaches, shortness of breath or vomiting.       Review of Systems     Review of Systems   Constitutional:  Negative for activity change, appetite change, chills, fatigue and fever.   HENT:  Positive for sore throat. Negative for nasal congestion, ear pain, hearing loss and postnasal drip.    Respiratory:  Positive for cough. Negative for chest tightness, shortness of breath and wheezing.    Cardiovascular:  Negative for chest pain, palpitations, leg swelling and claudication.   Gastrointestinal:  Negative for abdominal pain, change in bowel habit, constipation, diarrhea, nausea and vomiting.   Genitourinary:  Negative for  dysuria.   Musculoskeletal:  Negative for arthralgias, back pain, gait problem and myalgias.   Integumentary:  Negative for rash.   Neurological:  Negative for weakness and headaches.   Psychiatric/Behavioral:  Negative for suicidal ideas. The patient is not nervous/anxious.         Medical / Social / Family History     Past Medical History:   Diagnosis Date    Acute superficial gastritis without hemorrhage 12/20/2021    Adenomatous polyp of ascending colon 01/10/2022    Adenomatous polyp of descending colon 01/10/2022    Adenomatous polyp of sigmoid colon 01/10/2022    Chronic headaches     Depression     Diverticula, colon 01/10/2022    Mental impairment     Schizophrenia     Screening for malignant neoplasm of colon 01/10/2022    Urinary retention 07/12/2021       Past Surgical History:   Procedure Laterality Date    ANKLE SURGERY Right     CHOLECYSTECTOMY      DIAGNOSTIC LAPAROSCOPY      HYSTERECTOMY      OOPHORECTOMY      REDUCTION OF BOTH BREASTS      TOTAL REDUCTION MAMMOPLASTY         Social History  Ms. Shruthi Mcdermott  reports that she has never smoked. She has never been exposed to tobacco smoke. She has never used smokeless tobacco. She reports that she does not drink alcohol and does not use drugs.    Family History  Ms. Shruthi Mcdermott's family history includes Heart disease in her mother; Hypertension in her father.    Medications and Allergies     Medications  Outpatient Medications Marked as Taking for the 1/9/24 encounter (Office Visit) with Yaritza Garsia, SJ, FNP   Medication Sig Dispense Refill    amoxicillin (AMOXIL) 500 MG Tab Take 500 mg by mouth every 12 (twelve) hours.      ARIPiprazole (ABILIFY) 10 MG Tab Take 10 mg by mouth every morning.      benztropine (COGENTIN) 1 MG tablet Take 1 mg by mouth 2 (two) times daily.      COLACE 100 mg capsule Take 200 mg by mouth once daily.      haloperidol decanoate (HALDOL DECANOATE) 100 mg/mL injection 100 mg every 21 days.      hydrOXYzine  "pamoate (VISTARIL) 50 MG Cap Take 50 mg by mouth nightly as needed.      levothyroxine (SYNTHROID) 100 MCG tablet Take 1 tablet (100 mcg total) by mouth before breakfast. For THYROID 90 tablet 1    mirtazapine (REMERON) 15 MG tablet Take 7.5 mg by mouth every evening.      propranoloL (INDERAL) 10 MG tablet Take 10 mg by mouth 2 (two) times daily.      REXULTI 2 mg Tab Take 1 tablet by mouth.      ZOLOFT 25 mg tablet Take 25 mg by mouth.         Allergies  Review of patient's allergies indicates:  No Known Allergies    Physical Examination     Vitals:    01/09/24 0908   BP: 115/82   BP Location: Left arm   Patient Position: Sitting   BP Method: Large (Automatic)   Pulse: 74   Resp: 18   Temp: 98.9 °F (37.2 °C)   TempSrc: Oral   SpO2: 97%   Weight: 77.7 kg (171 lb 6.4 oz)   Height: 5' 4" (1.626 m)     Physical Exam  Vitals and nursing note reviewed.   Constitutional:       General: She is not in acute distress.  HENT:      Nose: Congestion and rhinorrhea present.      Mouth/Throat:      Mouth: Mucous membranes are moist.   Eyes:      Pupils: Pupils are equal, round, and reactive to light.   Cardiovascular:      Rate and Rhythm: Normal rate and regular rhythm.      Pulses: Normal pulses.      Heart sounds: Normal heart sounds. No murmur heard.  Pulmonary:      Effort: Pulmonary effort is normal. No respiratory distress.      Breath sounds: Normal breath sounds. No wheezing, rhonchi or rales.   Chest:      Chest wall: No tenderness.   Abdominal:      General: Bowel sounds are normal.      Palpations: Abdomen is soft.   Musculoskeletal:         General: Normal range of motion.      Cervical back: Normal range of motion and neck supple.      Right lower leg: No edema.      Left lower leg: No edema.   Skin:     General: Skin is warm and dry.   Neurological:      General: No focal deficit present.      Mental Status: She is alert and oriented to person, place, and time.          Assessment and Plan (including Health " Maintenance)      Problem List  Smart Sets  Document Outside HM   :    Plan:         Health Maintenance Due   Topic Date Due    Hepatitis C Screening  Never done    COVID-19 Vaccine (1) Never done    TETANUS VACCINE  Never done    Shingles Vaccine (1 of 2) Never done    Influenza Vaccine (1) Never done    Mammogram  01/10/2024       Problem List Items Addressed This Visit    None  Visit Diagnoses       Allergic rhinitis, unspecified seasonality, unspecified trigger    -  Primary    Relevant Medications    chlorpheniramine-phenylephrine 4-10 mg per tablet    fluticasone propionate (FLONASE) 50 mcg/actuation nasal spray    Cough, unspecified type        Relevant Orders    POCT Influenza A/B (Completed)    Encounter for laboratory testing for COVID-19 virus        Relevant Orders    POCT COVID-19 Rapid Screening (Completed)          Allergic rhinitis, unspecified seasonality, unspecified trigger  -     chlorpheniramine-phenylephrine 4-10 mg per tablet; Take 1 tablet by mouth 3 (three) times daily as needed for Congestion.  Dispense: 30 tablet; Refill: 0  -     fluticasone propionate (FLONASE) 50 mcg/actuation nasal spray; 2 sprays (100 mcg total) by Each Nostril route once daily.  Dispense: 16 mL; Refill: 0    Cough, unspecified type  -     POCT Influenza A/B    Encounter for laboratory testing for COVID-19 virus  -     POCT COVID-19 Rapid Screening       Health Maintenance Topics with due status: Not Due       Topic Last Completion Date    Colorectal Cancer Screening 01/10/2022    Hemoglobin A1c (Diabetic Prevention Screening) 12/01/2022    Lipid Panel 12/01/2022         Future Appointments   Date Time Provider Department Center   3/13/2024 11:00 AM [facility]        Follow up if symptoms worsen or fail to improve.     Signature:  Yaritza Garsia, SJ, FNP  [address] [place] [address]  Phone #: [phone]  Fax #: [phone]    Date of  encounter: 1/9/24    Patient Instructions   Increase fluid intake. Take meds as prescribed. Follow up if no improvement in 5-7 days.  Complete antibiotics previously prescribed.

## 2024-01-11 NOTE — PATIENT INSTRUCTIONS
Increase fluid intake. Take meds as prescribed. Follow up if no improvement in 5-7 days.  Complete antibiotics previously prescribed.

## 2024-01-24 DIAGNOSIS — E03.9 HYPOTHYROIDISM (ACQUIRED): Chronic | ICD-10-CM

## 2024-01-25 RX ORDER — LEVOTHYROXINE SODIUM 100 UG/1
TABLET ORAL
Qty: 30 TABLET | Refills: 0 | Status: SHIPPED | OUTPATIENT
Start: 2024-01-25 | End: 2024-02-19 | Stop reason: SDUPTHER

## 2024-02-07 ENCOUNTER — OFFICE VISIT (OUTPATIENT)
Dept: FAMILY MEDICINE | Facility: CLINIC | Age: 54
End: 2024-02-07
Payer: MEDICARE

## 2024-02-07 VITALS
WEIGHT: 171 LBS | TEMPERATURE: 99 F | BODY MASS INDEX: 29.19 KG/M2 | SYSTOLIC BLOOD PRESSURE: 133 MMHG | HEART RATE: 70 BPM | OXYGEN SATURATION: 99 % | RESPIRATION RATE: 20 BRPM | DIASTOLIC BLOOD PRESSURE: 95 MMHG | HEIGHT: 64 IN

## 2024-02-07 DIAGNOSIS — R53.83 FATIGUE, UNSPECIFIED TYPE: ICD-10-CM

## 2024-02-07 DIAGNOSIS — E03.9 HYPOTHYROIDISM (ACQUIRED): Primary | Chronic | ICD-10-CM

## 2024-02-07 DIAGNOSIS — Z79.899 HIGH RISK MEDICATION USE: ICD-10-CM

## 2024-02-07 DIAGNOSIS — F32.A DEPRESSION, UNSPECIFIED DEPRESSION TYPE: ICD-10-CM

## 2024-02-07 DIAGNOSIS — F20.81 SCHIZOPHRENIFORM DISORDER: ICD-10-CM

## 2024-02-07 LAB
BASOPHILS # BLD AUTO: 0.04 K/UL (ref 0–0.2)
BASOPHILS NFR BLD AUTO: 0.5 % (ref 0–1)
BILIRUB UR QL STRIP: NEGATIVE
CLARITY UR: NORMAL
COLOR UR: YELLOW
DIFFERENTIAL METHOD BLD: ABNORMAL
EOSINOPHIL # BLD AUTO: 0.27 K/UL (ref 0–0.5)
EOSINOPHIL NFR BLD AUTO: 3.5 % (ref 1–4)
ERYTHROCYTE [DISTWIDTH] IN BLOOD BY AUTOMATED COUNT: 13.8 % (ref 11.5–14.5)
GLUCOSE UR STRIP-MCNC: NORMAL MG/DL
HCT VFR BLD AUTO: 45 % (ref 38–47)
HGB BLD-MCNC: 14.7 G/DL (ref 12–16)
IMM GRANULOCYTES # BLD AUTO: 0.05 K/UL (ref 0–0.04)
IMM GRANULOCYTES NFR BLD: 0.6 % (ref 0–0.4)
KETONES UR STRIP-SCNC: NEGATIVE MG/DL
LEUKOCYTE ESTERASE UR QL STRIP: NEGATIVE
LYMPHOCYTES # BLD AUTO: 1.19 K/UL (ref 1–4.8)
LYMPHOCYTES NFR BLD AUTO: 15.4 % (ref 27–41)
MCH RBC QN AUTO: 28.9 PG (ref 27–31)
MCHC RBC AUTO-ENTMCNC: 32.7 G/DL (ref 32–36)
MCV RBC AUTO: 88.4 FL (ref 80–96)
MONOCYTES # BLD AUTO: 0.55 K/UL (ref 0–0.8)
MONOCYTES NFR BLD AUTO: 7.1 % (ref 2–6)
MPC BLD CALC-MCNC: 10.7 FL (ref 9.4–12.4)
NEUTROPHILS # BLD AUTO: 5.65 K/UL (ref 1.8–7.7)
NEUTROPHILS NFR BLD AUTO: 72.9 % (ref 53–65)
NITRITE UR QL STRIP: NEGATIVE
NRBC # BLD AUTO: 0 X10E3/UL
NRBC, AUTO (.00): 0 %
PH UR STRIP: 5 PH UNITS
PLATELET # BLD AUTO: 274 K/UL (ref 150–400)
PROT UR QL STRIP: NEGATIVE
RBC # BLD AUTO: 5.09 M/UL (ref 4.2–5.4)
RBC # UR STRIP: NEGATIVE /UL
SP GR UR STRIP: 1.02
UROBILINOGEN UR STRIP-ACNC: NORMAL MG/DL
WBC # BLD AUTO: 7.75 K/UL (ref 4.5–11)

## 2024-02-07 PROCEDURE — 99214 OFFICE O/P EST MOD 30 MIN: CPT | Mod: ,,, | Performed by: NURSE PRACTITIONER

## 2024-02-07 PROCEDURE — 3075F SYST BP GE 130 - 139MM HG: CPT | Mod: ,,, | Performed by: NURSE PRACTITIONER

## 2024-02-07 PROCEDURE — 1160F RVW MEDS BY RX/DR IN RCRD: CPT | Mod: ,,, | Performed by: NURSE PRACTITIONER

## 2024-02-07 PROCEDURE — 1159F MED LIST DOCD IN RCRD: CPT | Mod: ,,, | Performed by: NURSE PRACTITIONER

## 2024-02-07 PROCEDURE — 3008F BODY MASS INDEX DOCD: CPT | Mod: ,,, | Performed by: NURSE PRACTITIONER

## 2024-02-07 PROCEDURE — 81003 URINALYSIS AUTO W/O SCOPE: CPT | Mod: QW,,, | Performed by: CLINICAL MEDICAL LABORATORY

## 2024-02-07 PROCEDURE — 3080F DIAST BP >= 90 MM HG: CPT | Mod: ,,, | Performed by: NURSE PRACTITIONER

## 2024-02-07 PROCEDURE — 80050 GENERAL HEALTH PANEL: CPT | Mod: ,,, | Performed by: CLINICAL MEDICAL LABORATORY

## 2024-02-07 RX ORDER — SERTRALINE HYDROCHLORIDE 100 MG/1
100 TABLET, FILM COATED ORAL DAILY
COMMUNITY
Start: 2024-02-06

## 2024-02-07 NOTE — PROGRESS NOTES
"   Yaritza Garsia DNP, BUTCH    [address] [place] [address]     PATIENT NAME: Shruthi Mcdermott  : 1970  DATE: 24  MRN: [id number]      Billing Provider: Yaritza Garsia DNP, FNP  Level of Service:   Patient PCP Information       Provider PCP Type    Chemo Trimble MD General            Reason for Visit / Chief Complaint: Depression (States she has been having trouble with depression. PHQ9 score of 21-severe. States it has been going on "for a while.") and Fatigue       Update PCP  Update Chief Complaint         History of Present Illness / Problem Focused Workflow     Shruthi Mcdermott presents to the clinic with Depression (States she has been having trouble with depression. PHQ9 score of 21-severe. States it has been going on "for a while.") and Fatigue   She is due for injection at [facility] today.   Pt does not have a plan for suicide. She is currently a patient at [facility].     DepressionPatient presents with the following symptoms: nervousness/anxiety and suicidal ideas.  Patient is not experiencing: palpitations and shortness of breath.          Review of Systems     Review of Systems   Constitutional:  Negative for activity change, appetite change, chills, fatigue and fever.   HENT:  Negative for nasal congestion, ear pain, hearing loss, postnasal drip and sore throat.    Respiratory:  Negative for cough, chest tightness, shortness of breath and wheezing.    Cardiovascular:  Negative for chest pain, palpitations, leg swelling and claudication.   Gastrointestinal:  Negative for abdominal pain, change in bowel habit, constipation, diarrhea, nausea and vomiting.   Genitourinary:  Negative for dysuria.   Musculoskeletal:  Negative for arthralgias, back pain, gait problem and myalgias.   Integumentary:  Negative for rash.   Neurological:  Positive for weakness. Negative for headaches.   Psychiatric/Behavioral:  Positive for depression and suicidal ideas. " The patient is nervous/anxious.         Medical / Social / Family History     Past Medical History:   Diagnosis Date    Acute superficial gastritis without hemorrhage 12/20/2021    Adenomatous polyp of ascending colon 01/10/2022    Adenomatous polyp of descending colon 01/10/2022    Adenomatous polyp of sigmoid colon 01/10/2022    Chronic headaches     Depression     Diverticula, colon 01/10/2022    Mental impairment     Schizophrenia     Screening for malignant neoplasm of colon 01/10/2022    Urinary retention 07/12/2021       Past Surgical History:   Procedure Laterality Date    ANKLE SURGERY Right     CHOLECYSTECTOMY      DIAGNOSTIC LAPAROSCOPY      HYSTERECTOMY      OOPHORECTOMY      REDUCTION OF BOTH BREASTS      TOTAL REDUCTION MAMMOPLASTY         Social History  Ms. Shruthi Mcdermott  reports that she has never smoked. She has never been exposed to tobacco smoke. She has never used smokeless tobacco. She reports that she does not drink alcohol and does not use drugs.    Family History  Ms. Shruthi Mcdermott's family history includes Heart disease in her mother; Hypertension in her father.    Medications and Allergies     Medications  Outpatient Medications Marked as Taking for the 2/7/24 encounter (Office Visit) with Yaritza Garsia, SJ, FNP   Medication Sig Dispense Refill    ARIPiprazole (ABILIFY) 10 MG Tab Take 10 mg by mouth every morning.      benztropine (COGENTIN) 1 MG tablet Take 1 mg by mouth 2 (two) times daily.      COLACE 100 mg capsule Take 200 mg by mouth once daily.      fluticasone propionate (FLONASE) 50 mcg/actuation nasal spray 2 sprays (100 mcg total) by Each Nostril route once daily. 16 mL 0    haloperidol decanoate (HALDOL DECANOATE) 100 mg/mL injection 100 mg every 21 days.      levothyroxine (SYNTHROID) 100 MCG tablet TAKE 1 TABLET BY MOUTH ONCE DAILY BEFORE BREAKFAST FOR THYROID 30 tablet 0    mirtazapine (REMERON) 15 MG tablet Take 7.5 mg by mouth every evening.      propranoloL  "(INDERAL) 10 MG tablet Take 10 mg by mouth 2 (two) times daily.      sertraline (ZOLOFT) 100 MG tablet Take 100 mg by mouth once daily.         Allergies  Review of patient's allergies indicates:  No Known Allergies    Physical Examination     Vitals:    02/07/24 0959 02/07/24 1009   BP: (!) 143/93 (!) 133/95   BP Location: Right arm Left arm   Patient Position: Sitting Sitting   BP Method: Large (Automatic) Large (Automatic)   Pulse: 70    Resp: 20    Temp: 98.5 °F (36.9 °C)    TempSrc: Oral    SpO2: 99%    Weight: 77.6 kg (171 lb)    Height: 5' 4" (1.626 m)      Physical Exam  Vitals and nursing note reviewed.   Constitutional:       General: She is not in acute distress.     Appearance: Normal appearance. She is not ill-appearing.   Eyes:      Extraocular Movements: Extraocular movements intact.      Pupils: Pupils are equal, round, and reactive to light.   Cardiovascular:      Rate and Rhythm: Normal rate and regular rhythm.      Heart sounds: Normal heart sounds.   Pulmonary:      Effort: Pulmonary effort is normal.      Breath sounds: Normal breath sounds.   Abdominal:      General: Bowel sounds are normal.      Palpations: Abdomen is soft.   Musculoskeletal:         General: Normal range of motion.   Skin:     Findings: No rash.   Neurological:      General: No focal deficit present.      Mental Status: She is alert and oriented to person, place, and time. Mental status is at baseline.   Psychiatric:         Mood and Affect: Mood is depressed.         Behavior: Behavior normal.          Assessment and Plan (including Health Maintenance)      Problem List  Smart Sets  Document Outside HM   :    Plan:         Health Maintenance Due   Topic Date Due    Hepatitis C Screening  Never done    COVID-19 Vaccine (1) Never done    TETANUS VACCINE  Never done    Shingles Vaccine (1 of 2) Never done    Influenza Vaccine (1) Never done    Mammogram  01/10/2024       Problem List Items Addressed This Visit          " Psychiatric    Depression    Schizophrenia (Chronic)       Endocrine    Hypothyroidism (acquired) - Primary (Chronic)    Relevant Orders    TSH (Completed)     Other Visit Diagnoses       High risk medication use        Relevant Orders    CBC Auto Differential (Completed)    Comprehensive Metabolic Panel (Completed)    Fatigue, unspecified type        Relevant Orders    TSH (Completed)    CBC Auto Differential (Completed)    Comprehensive Metabolic Panel (Completed)    Urinalysis, Reflex to Urine Culture (Completed)          Hypothyroidism (acquired)  -     TSH; Future; Expected date: 02/07/2024    Schizophreniform disorder    High risk medication use  -     CBC Auto Differential; Future; Expected date: 02/07/2024  -     Comprehensive Metabolic Panel; Future; Expected date: 02/07/2024    Fatigue, unspecified type  -     TSH; Future; Expected date: 02/07/2024  -     CBC Auto Differential; Future; Expected date: 02/07/2024  -     Comprehensive Metabolic Panel; Future; Expected date: 02/07/2024  -     Urinalysis, Reflex to Urine Culture    Depression, unspecified depression type       Health Maintenance Topics with due status: Not Due       Topic Last Completion Date    Colorectal Cancer Screening 01/10/2022    Hemoglobin A1c (Diabetic Prevention Screening) 12/01/2022    Lipid Panel 12/01/2022           Future Appointments   Date Time Provider Department Center   3/13/2024 11:00 AM [facility]        Follow up if symptoms worsen or fail to improve.     Signature:  Yaritza Garsia DNP, FNP  [address] [place] [address]  Phone #: [phone]  Fax #: [phone]    Date of encounter: 2/7/24    Patient Instructions   Follow up with [facility] due to uncontrolled depression.       Dr Nolasco: [phone]

## 2024-02-08 LAB
ALBUMIN SERPL BCP-MCNC: 3.5 G/DL (ref 3.5–5)
ALBUMIN/GLOB SERPL: 1.1 {RATIO}
ALP SERPL-CCNC: 131 U/L (ref 41–108)
ALT SERPL W P-5'-P-CCNC: 22 U/L (ref 13–56)
ANION GAP SERPL CALCULATED.3IONS-SCNC: 5 MMOL/L (ref 7–16)
AST SERPL W P-5'-P-CCNC: 19 U/L (ref 15–37)
BILIRUB SERPL-MCNC: 0.2 MG/DL (ref ?–1.2)
BUN SERPL-MCNC: 11 MG/DL (ref 7–18)
BUN/CREAT SERPL: 13 (ref 6–20)
CALCIUM SERPL-MCNC: 9.5 MG/DL (ref 8.5–10.1)
CHLORIDE SERPL-SCNC: 111 MMOL/L (ref 98–107)
CO2 SERPL-SCNC: 29 MMOL/L (ref 21–32)
CREAT SERPL-MCNC: 0.84 MG/DL (ref 0.55–1.02)
EGFR (NO RACE VARIABLE) (RUSH/TITUS): 83 ML/MIN/1.73M2
GLOBULIN SER-MCNC: 3.1 G/DL (ref 2–4)
GLUCOSE SERPL-MCNC: 110 MG/DL (ref 74–106)
POTASSIUM SERPL-SCNC: 4.9 MMOL/L (ref 3.5–5.1)
PROT SERPL-MCNC: 6.6 G/DL (ref 6.4–8.2)
SODIUM SERPL-SCNC: 140 MMOL/L (ref 136–145)
TSH SERPL DL<=0.005 MIU/L-ACNC: 0.86 UIU/ML (ref 0.36–3.74)

## 2024-02-12 PROBLEM — F32.A DEPRESSION: Status: ACTIVE | Noted: 2024-02-12

## 2024-02-19 DIAGNOSIS — E03.9 HYPOTHYROIDISM (ACQUIRED): Chronic | ICD-10-CM

## 2024-02-19 RX ORDER — LEVOTHYROXINE SODIUM 100 UG/1
TABLET ORAL
Qty: 30 TABLET | Refills: 0 | Status: SHIPPED | OUTPATIENT
Start: 2024-02-19 | End: 2024-03-25

## 2024-03-13 ENCOUNTER — HOSPITAL ENCOUNTER (OUTPATIENT)
Dept: RADIOLOGY | Facility: HOSPITAL | Age: 54
Discharge: HOME | End: 2024-03-13
Attending: OBSTETRICS & GYNECOLOGY
Payer: MEDICARE

## 2024-03-13 VITALS — BODY MASS INDEX: 28.68 KG/M2 | HEIGHT: 64 IN | WEIGHT: 168 LBS

## 2024-03-13 DIAGNOSIS — Z12.31 BREAST CANCER SCREENING BY MAMMOGRAM: ICD-10-CM

## 2024-03-13 PROCEDURE — 77067 SCR MAMMO BI INCL CAD: CPT | Mod: TC

## 2024-03-22 ENCOUNTER — TELEPHONE (OUTPATIENT)
Dept: OBSTETRICS AND GYNECOLOGY | Facility: CLINIC | Age: 54
End: 2024-03-22
Payer: MEDICARE

## 2024-03-22 NOTE — TELEPHONE ENCOUNTER
----- Message from Neelima Lira sent at 3/22/2024 10:42 AM CDT -----  Regarding: RESULTS  PATIENT IS REQUESTING THE RESULTS OF HER MAMMOGRAM.  CALL BACK NUMBER IS [phone].

## 2024-03-23 DIAGNOSIS — E03.9 HYPOTHYROIDISM (ACQUIRED): Chronic | ICD-10-CM

## 2024-03-25 ENCOUNTER — TELEPHONE (OUTPATIENT)
Dept: OBSTETRICS AND GYNECOLOGY | Facility: CLINIC | Age: 54
End: 2024-03-25
Payer: MEDICARE

## 2024-03-25 RX ORDER — LEVOTHYROXINE SODIUM 100 UG/1
TABLET ORAL
Qty: 30 TABLET | Refills: 0 | Status: SHIPPED | OUTPATIENT
Start: 2024-03-25 | End: 2024-05-06 | Stop reason: SDUPTHER

## 2024-03-25 NOTE — TELEPHONE ENCOUNTER
----- Message from Neelima Lira sent at 3/22/2024 10:42 AM CDT -----  Regarding: RESULTS  PATIENT IS REQUESTING THE RESULTS OF HER MAMMOGRAM.  CALL BACK NUMBER IS [phone].      Verified ; Per Dr. Andres Call patient Monday and tell her her mammogram was normal. Repeat mammography screening in 1 year. Pt voiced understanding and agreed.

## 2024-05-06 DIAGNOSIS — E03.9 HYPOTHYROIDISM (ACQUIRED): Chronic | ICD-10-CM

## 2024-05-06 RX ORDER — LEVOTHYROXINE SODIUM 100 UG/1
TABLET ORAL
Qty: 90 TABLET | Refills: 0 | Status: SHIPPED | OUTPATIENT
Start: 2024-05-06

## 2024-07-09 DIAGNOSIS — Z71.89 COMPLEX CARE COORDINATION: ICD-10-CM

## 2024-08-04 DIAGNOSIS — E03.9 HYPOTHYROIDISM (ACQUIRED): Chronic | ICD-10-CM

## 2024-08-05 RX ORDER — LEVOTHYROXINE SODIUM 100 UG/1
TABLET ORAL
Qty: 30 TABLET | Refills: 0 | Status: SHIPPED | OUTPATIENT
Start: 2024-08-05

## 2024-09-09 ENCOUNTER — OFFICE VISIT (OUTPATIENT)
Dept: FAMILY MEDICINE | Facility: CLINIC | Age: 54
End: 2024-09-09
Payer: MEDICARE

## 2024-09-09 VITALS
DIASTOLIC BLOOD PRESSURE: 76 MMHG | HEART RATE: 61 BPM | SYSTOLIC BLOOD PRESSURE: 111 MMHG | WEIGHT: 174.19 LBS | OXYGEN SATURATION: 97 % | BODY MASS INDEX: 29.74 KG/M2 | HEIGHT: 64 IN | RESPIRATION RATE: 20 BRPM | TEMPERATURE: 98 F

## 2024-09-09 DIAGNOSIS — E03.9 HYPOTHYROIDISM (ACQUIRED): Primary | Chronic | ICD-10-CM

## 2024-09-09 DIAGNOSIS — E03.9 HYPOTHYROIDISM (ACQUIRED): Chronic | ICD-10-CM

## 2024-09-09 DIAGNOSIS — R73.03 PREDIABETES: ICD-10-CM

## 2024-09-09 DIAGNOSIS — E53.8 B12 DEFICIENCY: ICD-10-CM

## 2024-09-09 DIAGNOSIS — E55.9 VITAMIN D DEFICIENCY: ICD-10-CM

## 2024-09-09 LAB
25(OH)D3 SERPL-MCNC: 16.8 NG/ML
ALBUMIN SERPL BCP-MCNC: 3.3 G/DL (ref 3.5–5)
ALBUMIN/GLOB SERPL: 0.9 {RATIO}
ALP SERPL-CCNC: 114 U/L (ref 46–118)
ALT SERPL W P-5'-P-CCNC: 22 U/L (ref 13–56)
ANION GAP SERPL CALCULATED.3IONS-SCNC: 9 MMOL/L (ref 7–16)
AST SERPL W P-5'-P-CCNC: 12 U/L (ref 15–37)
BASOPHILS # BLD AUTO: 0.05 K/UL (ref 0–0.2)
BASOPHILS NFR BLD AUTO: 0.6 % (ref 0–1)
BILIRUB SERPL-MCNC: 0.2 MG/DL (ref ?–1.2)
BUN SERPL-MCNC: 7 MG/DL (ref 7–18)
BUN/CREAT SERPL: 9 (ref 6–20)
CALCIUM SERPL-MCNC: 9.2 MG/DL (ref 8.5–10.1)
CHLORIDE SERPL-SCNC: 112 MMOL/L (ref 98–107)
CO2 SERPL-SCNC: 26 MMOL/L (ref 21–32)
CREAT SERPL-MCNC: 0.77 MG/DL (ref 0.55–1.02)
DIFFERENTIAL METHOD BLD: ABNORMAL
EGFR (NO RACE VARIABLE) (RUSH/TITUS): 91 ML/MIN/1.73M2
EOSINOPHIL # BLD AUTO: 0.38 K/UL (ref 0–0.5)
EOSINOPHIL NFR BLD AUTO: 4.9 % (ref 1–4)
ERYTHROCYTE [DISTWIDTH] IN BLOOD BY AUTOMATED COUNT: 14.4 % (ref 11.5–14.5)
EST. AVERAGE GLUCOSE BLD GHB EST-MCNC: 131 MG/DL
GLOBULIN SER-MCNC: 3.6 G/DL (ref 2–4)
GLUCOSE SERPL-MCNC: 95 MG/DL (ref 74–106)
HBA1C MFR BLD HPLC: 6.2 % (ref 4.5–6.6)
HCT VFR BLD AUTO: 45.7 % (ref 38–47)
HGB BLD-MCNC: 14.1 G/DL (ref 12–16)
IMM GRANULOCYTES # BLD AUTO: 0.05 K/UL (ref 0–0.04)
IMM GRANULOCYTES NFR BLD: 0.6 % (ref 0–0.4)
LYMPHOCYTES # BLD AUTO: 1.51 K/UL (ref 1–4.8)
LYMPHOCYTES NFR BLD AUTO: 19.5 % (ref 27–41)
MCH RBC QN AUTO: 29.1 PG (ref 27–31)
MCHC RBC AUTO-ENTMCNC: 30.9 G/DL (ref 32–36)
MCV RBC AUTO: 94.2 FL (ref 80–96)
MONOCYTES # BLD AUTO: 0.63 K/UL (ref 0–0.8)
MONOCYTES NFR BLD AUTO: 8.2 % (ref 2–6)
MPC BLD CALC-MCNC: 10.8 FL (ref 9.4–12.4)
NEUTROPHILS # BLD AUTO: 5.11 K/UL (ref 1.8–7.7)
NEUTROPHILS NFR BLD AUTO: 66.2 % (ref 53–65)
NRBC # BLD AUTO: 0 X10E3/UL
NRBC, AUTO (.00): 0 %
PLATELET # BLD AUTO: 254 K/UL (ref 150–400)
POTASSIUM SERPL-SCNC: 4.2 MMOL/L (ref 3.5–5.1)
PROT SERPL-MCNC: 6.9 G/DL (ref 6.4–8.2)
RBC # BLD AUTO: 4.85 M/UL (ref 4.2–5.4)
SODIUM SERPL-SCNC: 143 MMOL/L (ref 136–145)
TSH SERPL DL<=0.005 MIU/L-ACNC: 1.19 UIU/ML (ref 0.36–3.74)
VIT B12 SERPL-MCNC: 244 PG/ML (ref 193–986)
WBC # BLD AUTO: 7.73 K/UL (ref 4.5–11)

## 2024-09-09 PROCEDURE — 3078F DIAST BP <80 MM HG: CPT | Mod: ,,, | Performed by: FAMILY MEDICINE

## 2024-09-09 PROCEDURE — 83036 HEMOGLOBIN GLYCOSYLATED A1C: CPT | Mod: ,,, | Performed by: CLINICAL MEDICAL LABORATORY

## 2024-09-09 PROCEDURE — 3044F HG A1C LEVEL LT 7.0%: CPT | Mod: ,,, | Performed by: FAMILY MEDICINE

## 2024-09-09 PROCEDURE — 1160F RVW MEDS BY RX/DR IN RCRD: CPT | Mod: ,,, | Performed by: FAMILY MEDICINE

## 2024-09-09 PROCEDURE — 99214 OFFICE O/P EST MOD 30 MIN: CPT | Mod: ,,, | Performed by: FAMILY MEDICINE

## 2024-09-09 PROCEDURE — 82607 VITAMIN B-12: CPT | Mod: ,,, | Performed by: CLINICAL MEDICAL LABORATORY

## 2024-09-09 PROCEDURE — 3074F SYST BP LT 130 MM HG: CPT | Mod: ,,, | Performed by: FAMILY MEDICINE

## 2024-09-09 PROCEDURE — 82306 VITAMIN D 25 HYDROXY: CPT | Mod: ,,, | Performed by: CLINICAL MEDICAL LABORATORY

## 2024-09-09 PROCEDURE — 80050 GENERAL HEALTH PANEL: CPT | Mod: ,,, | Performed by: CLINICAL MEDICAL LABORATORY

## 2024-09-09 PROCEDURE — 3008F BODY MASS INDEX DOCD: CPT | Mod: ,,, | Performed by: FAMILY MEDICINE

## 2024-09-09 PROCEDURE — 1159F MED LIST DOCD IN RCRD: CPT | Mod: ,,, | Performed by: FAMILY MEDICINE

## 2024-09-09 NOTE — ASSESSMENT & PLAN NOTE
- Check glucose outside of clinic, record numbers, and bring log to follow up visit.    - Take medications as directed, and bring all medications to every clinic appointment.    - Eat a diabetic diet, if there are concerns about what that entails, we have a diabetic educator in our clinic.    - Cardiovascular exercise at least 3 times per week, for at least 15 minutes.    - Patient should be on a Statin medication, unless patient cannot tolerate a Statin.     - Recommend yearly, dilated eye exams for all Diabetic Patients.    - Monitor feet for calluses, abrasion, or other abnormalities. Report any concerns at every clinic visit.    -   Hemoglobin A1C   Date Value Ref Range Status   12/01/2022 6.2 4.5 - 6.6 % Final     Comment:       Normal:               <5.7%  Pre-Diabetic:       5.7% to 6.4%  Diabetic:             >6.4%  Diabetic Goal:     <7%

## 2024-09-09 NOTE — PROGRESS NOTES
Chemo Trimble MD    [address] [place] [address]     PATIENT NAME: Shruthi Mcdermott  : 1970  DATE: 24  MRN: [id number]      Billing Provider: Chmeo Trimble MD  Level of Service: ID OFFICE/OUTPT VISIT, EST, LEVL IV, 30-39 MIN  Patient PCP Information       Provider PCP Type    Chemo Trimble MD General            Reason for Visit / Chief Complaint: Thyroid Problem (Pt wants to get her thyroid check. )       Update PCP  Update Chief Complaint         History of Present Illness / Problem Focused Workflow     Shruthi Mcdermott presents to the clinic with Thyroid Problem (Pt wants to get her thyroid check. )     Also notes significant fatigue    She ran out of her thyroid medication a few days ago     History of prediabetes noted in her chart, will check a HbA1c today    HPI    Review of Systems     Review of Systems   Constitutional:  Negative for activity change, appetite change, chills, fatigue and fever.   HENT:  Negative for nasal congestion, ear pain, hearing loss, postnasal drip and sore throat.    Respiratory:  Negative for cough, chest tightness, shortness of breath and wheezing.    Cardiovascular:  Negative for chest pain, palpitations, leg swelling and claudication.   Gastrointestinal:  Negative for abdominal pain, change in bowel habit, constipation, diarrhea, nausea and vomiting.   Genitourinary:  Negative for dysuria.   Musculoskeletal:  Negative for arthralgias, back pain, gait problem and myalgias.   Integumentary:  Negative for rash.   Neurological:  Negative for weakness and headaches.   Psychiatric/Behavioral:  Negative for suicidal ideas. The patient is not nervous/anxious.         Medical / Social / Family History     Past Medical History:   Diagnosis Date    Acute superficial gastritis without hemorrhage 2021    Adenomatous polyp of ascending colon 01/10/2022    Adenomatous polyp of descending colon 01/10/2022     Adenomatous polyp of sigmoid colon 01/10/2022    Chronic headaches     Depression     Diverticula, colon 01/10/2022    Mental impairment     Schizophrenia     Screening for malignant neoplasm of colon 01/10/2022    Urinary retention 07/12/2021       Past Surgical History:   Procedure Laterality Date    ANKLE SURGERY Right     CHOLECYSTECTOMY      DIAGNOSTIC LAPAROSCOPY      HYSTERECTOMY      OOPHORECTOMY      REDUCTION OF BOTH BREASTS      TOTAL REDUCTION MAMMOPLASTY         Social History  Ms.  reports that she has never smoked. She has never been exposed to tobacco smoke. She has never used smokeless tobacco. She reports that she does not drink alcohol and does not use drugs.    Family History  Ms.'s family history includes Heart disease in her mother; Hypertension in her father.    Medications and Allergies     Medications  Outpatient Medications Marked as Taking for the 9/9/24 encounter (Office Visit) with Chemo Trimble MD   Medication Sig Dispense Refill    ARIPiprazole (ABILIFY) 10 MG Tab Take 10 mg by mouth every morning.      benztropine (COGENTIN) 1 MG tablet Take 1 mg by mouth 2 (two) times daily.      COLACE 100 mg capsule Take 200 mg by mouth once daily.      DULoxetine (CYMBALTA) 60 MG capsule Take 60 mg by mouth once daily.      fluticasone propionate (FLONASE) 50 mcg/actuation nasal spray 2 sprays (100 mcg total) by Each Nostril route once daily. 16 mL 0    haloperidol decanoate (HALDOL DECANOATE) 100 mg/mL injection 100 mg every 21 days.      hydrOXYzine pamoate (VISTARIL) 50 MG Cap Take 50 mg by mouth nightly as needed.      mirtazapine (REMERON) 15 MG tablet Take 7.5 mg by mouth every evening.      propranoloL (INDERAL) 10 MG tablet Take 10 mg by mouth 2 (two) times daily.      sertraline (ZOLOFT) 100 MG tablet Take 100 mg by mouth once daily.      [DISCONTINUED] levothyroxine (SYNTHROID) 100 MCG tablet TAKE 1 TABLET BY MOUTH ONCE DAILY BEFORE BREAKFAST FOR THYROID 30 tablet 0     Current  Facility-Administered Medications for the 9/9/24 encounter (Office Visit) with Chemo Trimble MD   Medication Dose Route Frequency Provider Last Rate Last Admin    cyanocobalamin injection 1,000 mcg  1,000 mcg Intramuscular Weekly Chemo Trimble MD   1,000 mcg at 09/19/24 1539       Allergies  Review of patient's allergies indicates:  No Known Allergies    Physical Examination     Vitals:    09/09/24 1251   BP: 111/76   Pulse: 61   Resp: 20   Temp: 97.6 °F (36.4 °C)     Physical Exam  Constitutional:       Appearance: Normal appearance.   HENT:      Head: Normocephalic and atraumatic.   Eyes:      Extraocular Movements: Extraocular movements intact.   Cardiovascular:      Rate and Rhythm: Normal rate and regular rhythm.   Pulmonary:      Effort: Pulmonary effort is normal.      Breath sounds: Normal breath sounds.   Skin:     General: Skin is warm.   Neurological:      Mental Status: She is alert and oriented to person, place, and time. Mental status is at baseline.            Assessment and Plan (including Health Maintenance)      Problem List  Smart Sets  Document Outside HM   :    Plan:     Restart thyroid medication. Labs today    Health Maintenance Due   Topic Date Due    Hepatitis C Screening  Never done    TETANUS VACCINE  Never done    Shingles Vaccine (1 of 2) Never done    Influenza Vaccine (1) Never done    COVID-19 Vaccine (1 - 2023-24 season) Never done       Problem List Items Addressed This Visit          Endocrine    Hypothyroidism (acquired) - Primary (Chronic)    Relevant Orders    TSH (Completed)    Prediabetes (Chronic)    Current Assessment & Plan      - Check glucose outside of clinic, record numbers, and bring log to follow up visit.    - Take medications as directed, and bring all medications to every clinic appointment.    - Eat a diabetic diet, if there are concerns about what that entails, we have a diabetic educator in our clinic.    - Cardiovascular exercise at least 3 times  per week, for at least 15 minutes.    - Patient should be on a Statin medication, unless patient cannot tolerate a Statin.     - Recommend yearly, dilated eye exams for all Diabetic Patients.    - Monitor feet for calluses, abrasion, or other abnormalities. Report any concerns at every clinic visit.    -   Hemoglobin A1C   Date Value Ref Range Status   12/01/2022 6.2 4.5 - 6.6 % Final     Comment:       Normal:               <5.7%  Pre-Diabetic:       5.7% to 6.4%  Diabetic:             >6.4%  Diabetic Goal:     <7%               Relevant Orders    Comprehensive Metabolic Panel (Completed)    CBC Auto Differential (Completed)    Hemoglobin A1C (Completed)     Other Visit Diagnoses       B12 deficiency        Relevant Orders    Vitamin B12 (Completed)    Vitamin D deficiency        Relevant Orders    Vitamin D (Completed)            Health Maintenance Topics with due status: Not Due       Topic Last Completion Date    Colorectal Cancer Screening 01/10/2022    Lipid Panel 12/01/2022    Mammogram 03/13/2024    Hemoglobin A1c (Prediabetes) 09/09/2024       Procedures     Future Appointments   Date Time Provider Department Center   12/9/2024 10:30 AM Chemo Trimble MD [facility]   3/19/2025 10:40 AM [facility] [place] [facility] [place]        Follow up in 3 months (on 12/9/2024), or if symptoms worsen or fail to improve, for chronic health problems, diabetes.     Signature:  Chemo Trimble MD  [address] [place] [address]  Phone #: [phone]  Fax #: [phone]    Date of encounter: 9/9/24    There are no Patient Instructions on file for this visit.

## 2024-09-10 RX ORDER — LEVOTHYROXINE SODIUM 100 UG/1
TABLET ORAL
Qty: 30 TABLET | Refills: 0 | Status: SHIPPED | OUTPATIENT
Start: 2024-09-10

## 2024-09-12 ENCOUNTER — CLINICAL SUPPORT (OUTPATIENT)
Dept: FAMILY MEDICINE | Facility: CLINIC | Age: 54
End: 2024-09-12
Payer: MEDICARE

## 2024-09-12 DIAGNOSIS — E53.8 B12 DEFICIENCY: Primary | ICD-10-CM

## 2024-09-12 PROCEDURE — 96372 THER/PROPH/DIAG INJ SC/IM: CPT | Mod: ,,, | Performed by: FAMILY MEDICINE

## 2024-09-12 RX ORDER — CYANOCOBALAMIN 1000 UG/ML
1000 INJECTION, SOLUTION INTRAMUSCULAR; SUBCUTANEOUS
Status: COMPLETED | OUTPATIENT
Start: 2024-09-12 | End: 2024-09-12

## 2024-09-12 RX ADMIN — CYANOCOBALAMIN 1000 MCG: 1000 INJECTION, SOLUTION INTRAMUSCULAR; SUBCUTANEOUS at 12:09

## 2024-09-12 NOTE — PROGRESS NOTES
Patient presents for B12 injection. Administered in left deltoid. Patient tolerated well with no complaints.

## 2024-09-19 ENCOUNTER — CLINICAL SUPPORT (OUTPATIENT)
Dept: FAMILY MEDICINE | Facility: CLINIC | Age: 54
End: 2024-09-19
Payer: MEDICARE

## 2024-09-19 DIAGNOSIS — E53.8 B12 DEFICIENCY: Primary | ICD-10-CM

## 2024-09-19 PROCEDURE — 96372 THER/PROPH/DIAG INJ SC/IM: CPT | Mod: ,,, | Performed by: FAMILY MEDICINE

## 2024-09-19 RX ORDER — CYANOCOBALAMIN 1000 UG/ML
1000 INJECTION, SOLUTION INTRAMUSCULAR; SUBCUTANEOUS WEEKLY
Status: SHIPPED | OUTPATIENT
Start: 2024-09-19 | End: 2024-11-07

## 2024-09-19 RX ADMIN — CYANOCOBALAMIN 1000 MCG: 1000 INJECTION, SOLUTION INTRAMUSCULAR; SUBCUTANEOUS at 03:09

## 2024-09-19 NOTE — PROGRESS NOTES
Pt is here for 2nd dose of weekly B12 injection out of 8 doses for B12 deficiency. Administered 1 mL into pt right deltoid. Pt tolerated well. No reaction noted after waiting shot time.

## 2024-09-26 ENCOUNTER — CLINICAL SUPPORT (OUTPATIENT)
Dept: FAMILY MEDICINE | Facility: CLINIC | Age: 54
End: 2024-09-26
Payer: MEDICARE

## 2024-09-26 DIAGNOSIS — E53.8 B12 DEFICIENCY: Primary | ICD-10-CM

## 2024-09-26 PROCEDURE — 96372 THER/PROPH/DIAG INJ SC/IM: CPT | Mod: ,,, | Performed by: FAMILY MEDICINE

## 2024-09-26 RX ADMIN — CYANOCOBALAMIN 1000 MCG: 1000 INJECTION, SOLUTION INTRAMUSCULAR; SUBCUTANEOUS at 02:09

## 2024-10-03 PROBLEM — F25.1 SCHIZOAFFECTIVE DISORDER, DEPRESSIVE TYPE: Status: ACTIVE | Noted: 2024-10-03

## 2024-10-03 PROBLEM — E55.9 VITAMIN D DEFICIENCY: Status: ACTIVE | Noted: 2024-10-03

## 2024-10-05 PROBLEM — F06.1 CATATONIA: Status: ACTIVE | Noted: 2024-10-05

## 2024-11-26 DIAGNOSIS — E03.9 HYPOTHYROIDISM, UNSPECIFIED: ICD-10-CM

## 2024-11-26 RX ORDER — LEVOTHYROXINE SODIUM 100 UG/1
TABLET ORAL
Qty: 30 TABLET | Refills: 0 | Status: SHIPPED | OUTPATIENT
Start: 2024-11-26

## 2024-12-09 ENCOUNTER — OFFICE VISIT (OUTPATIENT)
Dept: FAMILY MEDICINE | Facility: CLINIC | Age: 54
End: 2024-12-09
Payer: MEDICARE

## 2024-12-09 VITALS
RESPIRATION RATE: 16 BRPM | TEMPERATURE: 97 F | OXYGEN SATURATION: 97 % | BODY MASS INDEX: 27.55 KG/M2 | SYSTOLIC BLOOD PRESSURE: 114 MMHG | WEIGHT: 161.38 LBS | HEIGHT: 64 IN | DIASTOLIC BLOOD PRESSURE: 80 MMHG | HEART RATE: 101 BPM

## 2024-12-09 DIAGNOSIS — F20.81 SCHIZOPHRENIFORM DISORDER: Chronic | ICD-10-CM

## 2024-12-09 DIAGNOSIS — R73.03 PREDIABETES: Chronic | ICD-10-CM

## 2024-12-09 DIAGNOSIS — E03.9 ACQUIRED HYPOTHYROIDISM: Primary | Chronic | ICD-10-CM

## 2024-12-09 PROCEDURE — 3044F HG A1C LEVEL LT 7.0%: CPT | Mod: ,,, | Performed by: FAMILY MEDICINE

## 2024-12-09 PROCEDURE — 1160F RVW MEDS BY RX/DR IN RCRD: CPT | Mod: ,,, | Performed by: FAMILY MEDICINE

## 2024-12-09 PROCEDURE — 3008F BODY MASS INDEX DOCD: CPT | Mod: ,,, | Performed by: FAMILY MEDICINE

## 2024-12-09 PROCEDURE — 99213 OFFICE O/P EST LOW 20 MIN: CPT | Mod: ,,, | Performed by: FAMILY MEDICINE

## 2024-12-09 PROCEDURE — 1159F MED LIST DOCD IN RCRD: CPT | Mod: ,,, | Performed by: FAMILY MEDICINE

## 2024-12-09 PROCEDURE — 3074F SYST BP LT 130 MM HG: CPT | Mod: ,,, | Performed by: FAMILY MEDICINE

## 2024-12-09 PROCEDURE — 3079F DIAST BP 80-89 MM HG: CPT | Mod: ,,, | Performed by: FAMILY MEDICINE

## 2024-12-09 RX ORDER — BENZTROPINE MESYLATE 1 MG/1
1 TABLET ORAL 2 TIMES DAILY
Qty: 60 TABLET | Refills: 1 | Status: SHIPPED | OUTPATIENT
Start: 2024-12-09

## 2024-12-09 RX ORDER — LEVOTHYROXINE SODIUM 100 UG/1
100 TABLET ORAL
Qty: 90 TABLET | Refills: 1 | Status: SHIPPED | OUTPATIENT
Start: 2024-12-09
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
## 2024-12-12 ENCOUNTER — TELEPHONE (OUTPATIENT)
Dept: FAMILY MEDICINE | Facility: CLINIC | Age: 54
End: 2024-12-12
Payer: MEDICARE

## 2024-12-12 NOTE — TELEPHONE ENCOUNTER
"----- Message from Gloria sent at 12/11/2024  1:10 PM CST -----  Regarding: Prescription  This pt came in today and asked for Dr. Trimble. I told her he is off on Wednesdays but I could take a message. She said the prescription for Cogentin wasn't printed right or something and that it should said "Two in the morning and two at night on the Cogentin 1 MG". She is asking if it can be fixed and she can come  new prescription. Thank you.  "

## 2024-12-20 ENCOUNTER — PATIENT OUTREACH (OUTPATIENT)
Dept: ADMINISTRATIVE | Facility: CLINIC | Age: 54
End: 2024-12-20

## 2024-12-24 ENCOUNTER — EXTERNAL HOSPITAL ADMISSION (OUTPATIENT)
Dept: ADMINISTRATIVE | Facility: CLINIC | Age: 54
End: 2024-12-24

## 2025-01-10 ENCOUNTER — OFFICE VISIT (OUTPATIENT)
Dept: FAMILY MEDICINE | Facility: CLINIC | Age: 55
End: 2025-01-10
Payer: MEDICARE

## 2025-01-10 VITALS
WEIGHT: 166 LBS | DIASTOLIC BLOOD PRESSURE: 80 MMHG | OXYGEN SATURATION: 97 % | RESPIRATION RATE: 18 BRPM | HEIGHT: 64 IN | BODY MASS INDEX: 28.34 KG/M2 | HEART RATE: 98 BPM | SYSTOLIC BLOOD PRESSURE: 114 MMHG | TEMPERATURE: 98 F

## 2025-01-10 DIAGNOSIS — Z12.11 ENCOUNTER FOR SCREENING COLONOSCOPY: ICD-10-CM

## 2025-01-10 DIAGNOSIS — F20.81 SCHIZOPHRENIFORM DISORDER: Primary | Chronic | ICD-10-CM

## 2025-01-10 DIAGNOSIS — E03.9 ACQUIRED HYPOTHYROIDISM: Chronic | ICD-10-CM

## 2025-01-10 PROCEDURE — 3079F DIAST BP 80-89 MM HG: CPT | Mod: ,,, | Performed by: FAMILY MEDICINE

## 2025-01-10 PROCEDURE — 3008F BODY MASS INDEX DOCD: CPT | Mod: ,,, | Performed by: FAMILY MEDICINE

## 2025-01-10 PROCEDURE — 99214 OFFICE O/P EST MOD 30 MIN: CPT | Mod: ,,, | Performed by: FAMILY MEDICINE

## 2025-01-10 PROCEDURE — 3074F SYST BP LT 130 MM HG: CPT | Mod: ,,, | Performed by: FAMILY MEDICINE

## 2025-01-10 PROCEDURE — 1160F RVW MEDS BY RX/DR IN RCRD: CPT | Mod: ,,, | Performed by: FAMILY MEDICINE

## 2025-01-10 PROCEDURE — 1159F MED LIST DOCD IN RCRD: CPT | Mod: ,,, | Performed by: FAMILY MEDICINE

## 2025-01-10 RX ORDER — BENZTROPINE MESYLATE 1 MG/1
1 TABLET ORAL 2 TIMES DAILY
Qty: 180 TABLET | Refills: 1 | Status: SHIPPED | OUTPATIENT
Start: 2025-01-10

## 2025-01-10 RX ORDER — ARIPIPRAZOLE 15 MG/1
15 TABLET ORAL
COMMUNITY
Start: 2024-09-16

## 2025-01-10 RX ORDER — DULOXETIN HYDROCHLORIDE 30 MG/1
90 CAPSULE, DELAYED RELEASE ORAL
COMMUNITY
Start: 2024-12-19

## 2025-01-10 RX ORDER — OLANZAPINE 10 MG/1
10 TABLET ORAL NIGHTLY
COMMUNITY
Start: 2024-12-19

## 2025-01-10 RX ORDER — LEVOTHYROXINE SODIUM 100 UG/1
100 TABLET ORAL
Qty: 90 TABLET | Refills: 1 | Status: SHIPPED | OUTPATIENT
Start: 2025-01-10
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
## 2025-01-13 DIAGNOSIS — Z12.11 SCREENING FOR COLON CANCER: Primary | ICD-10-CM

## 2025-03-19 ENCOUNTER — HOSPITAL ENCOUNTER (OUTPATIENT)
Dept: RADIOLOGY | Facility: HOSPITAL | Age: 55
Discharge: HOME | End: 2025-03-19
Attending: OBSTETRICS & GYNECOLOGY
Payer: MEDICARE

## 2025-03-19 VITALS — WEIGHT: 166 LBS | BODY MASS INDEX: 28.34 KG/M2 | HEIGHT: 64 IN

## 2025-03-19 DIAGNOSIS — Z12.31 VISIT FOR SCREENING MAMMOGRAM: ICD-10-CM

## 2025-03-19 PROCEDURE — 77067 SCR MAMMO BI INCL CAD: CPT | Mod: 26,,, | Performed by: RADIOLOGY

## 2025-03-19 PROCEDURE — 77067 SCR MAMMO BI INCL CAD: CPT | Mod: TC

## 2025-03-19 PROCEDURE — 77063 BREAST TOMOSYNTHESIS BI: CPT | Mod: 26,,, | Performed by: RADIOLOGY

## 2025-04-10 ENCOUNTER — OFFICE VISIT (OUTPATIENT)
Dept: FAMILY MEDICINE | Facility: CLINIC | Age: 55
End: 2025-04-10
Payer: MEDICARE

## 2025-04-10 VITALS
HEART RATE: 83 BPM | RESPIRATION RATE: 16 BRPM | DIASTOLIC BLOOD PRESSURE: 86 MMHG | SYSTOLIC BLOOD PRESSURE: 123 MMHG | OXYGEN SATURATION: 97 % | WEIGHT: 176.81 LBS | BODY MASS INDEX: 30.19 KG/M2 | TEMPERATURE: 98 F | HEIGHT: 64 IN

## 2025-04-10 DIAGNOSIS — E03.9 ACQUIRED HYPOTHYROIDISM: Chronic | ICD-10-CM

## 2025-04-10 DIAGNOSIS — R10.32 LEFT LOWER QUADRANT ABDOMINAL PAIN: Primary | ICD-10-CM

## 2025-04-10 DIAGNOSIS — R73.03 PREDIABETES: ICD-10-CM

## 2025-04-10 LAB
ALBUMIN SERPL BCP-MCNC: 3.9 G/DL (ref 3.5–5)
ALBUMIN/GLOB SERPL: 1.3 {RATIO}
ALP SERPL-CCNC: 107 U/L (ref 40–150)
ALT SERPL W P-5'-P-CCNC: 24 U/L
ANION GAP SERPL CALCULATED.3IONS-SCNC: 11 MMOL/L (ref 7–16)
AST SERPL W P-5'-P-CCNC: 22 U/L (ref 11–45)
BASOPHILS # BLD AUTO: 0.05 K/UL (ref 0–0.2)
BASOPHILS NFR BLD AUTO: 0.5 % (ref 0–1)
BILIRUB SERPL-MCNC: 0.3 MG/DL
BILIRUB UR QL STRIP: NEGATIVE
BUN SERPL-MCNC: 12 MG/DL (ref 10–20)
BUN/CREAT SERPL: 14 (ref 6–20)
CALCIUM SERPL-MCNC: 9.7 MG/DL (ref 8.4–10.2)
CHLORIDE SERPL-SCNC: 107 MMOL/L (ref 98–107)
CLARITY UR: CLEAR
CO2 SERPL-SCNC: 27 MMOL/L (ref 22–29)
COLOR UR: YELLOW
CREAT SERPL-MCNC: 0.87 MG/DL (ref 0.55–1.02)
DIFFERENTIAL METHOD BLD: ABNORMAL
EGFR (NO RACE VARIABLE) (RUSH/TITUS): 79 ML/MIN/1.73M2
EOSINOPHIL # BLD AUTO: 0.26 K/UL (ref 0–0.5)
EOSINOPHIL NFR BLD AUTO: 2.7 % (ref 1–4)
ERYTHROCYTE [DISTWIDTH] IN BLOOD BY AUTOMATED COUNT: 14 % (ref 11.5–14.5)
EST. AVERAGE GLUCOSE BLD GHB EST-MCNC: 163 MG/DL
GLOBULIN SER-MCNC: 2.9 G/DL (ref 2–4)
GLUCOSE SERPL-MCNC: 101 MG/DL (ref 74–100)
GLUCOSE UR STRIP-MCNC: NORMAL MG/DL
HBA1C MFR BLD HPLC: 7.3 %
HCT VFR BLD AUTO: 46.6 % (ref 38–47)
HGB BLD-MCNC: 14.4 G/DL (ref 12–16)
IMM GRANULOCYTES # BLD AUTO: 0.14 K/UL (ref 0–0.04)
IMM GRANULOCYTES NFR BLD: 1.5 % (ref 0–0.4)
KETONES UR STRIP-SCNC: NEGATIVE MG/DL
LEUKOCYTE ESTERASE UR QL STRIP: NEGATIVE
LYMPHOCYTES # BLD AUTO: 2.05 K/UL (ref 1–4.8)
LYMPHOCYTES NFR BLD AUTO: 21.4 % (ref 27–41)
MCH RBC QN AUTO: 27.2 PG (ref 27–31)
MCHC RBC AUTO-ENTMCNC: 30.9 G/DL (ref 32–36)
MCV RBC AUTO: 88.1 FL (ref 80–96)
MONOCYTES # BLD AUTO: 0.67 K/UL (ref 0–0.8)
MONOCYTES NFR BLD AUTO: 7 % (ref 2–6)
MPC BLD CALC-MCNC: 10 FL (ref 9.4–12.4)
NEUTROPHILS # BLD AUTO: 6.39 K/UL (ref 1.8–7.7)
NEUTROPHILS NFR BLD AUTO: 66.9 % (ref 53–65)
NITRITE UR QL STRIP: NEGATIVE
NRBC # BLD AUTO: 0 X10E3/UL
NRBC, AUTO (.00): 0 %
PH UR STRIP: 5 PH UNITS
PLATELET # BLD AUTO: 301 K/UL (ref 150–400)
POTASSIUM SERPL-SCNC: 4.2 MMOL/L (ref 3.5–5.1)
PROT SERPL-MCNC: 6.8 G/DL (ref 6.4–8.3)
PROT UR QL STRIP: NEGATIVE
RBC # BLD AUTO: 5.29 M/UL (ref 4.2–5.4)
RBC # UR STRIP: NEGATIVE /UL
SODIUM SERPL-SCNC: 141 MMOL/L (ref 136–145)
SP GR UR STRIP: 1.02
T4 FREE SERPL-MCNC: 1.27 NG/DL (ref 0.7–1.48)
TSH SERPL DL<=0.005 MIU/L-ACNC: 0.81 UIU/ML (ref 0.35–4.94)
UROBILINOGEN UR STRIP-ACNC: NORMAL MG/DL
WBC # BLD AUTO: 9.56 K/UL (ref 4.5–11)

## 2025-04-10 PROCEDURE — 3051F HG A1C>EQUAL 7.0%<8.0%: CPT | Mod: ,,, | Performed by: FAMILY MEDICINE

## 2025-04-10 PROCEDURE — 80050 GENERAL HEALTH PANEL: CPT | Mod: ,,, | Performed by: CLINICAL MEDICAL LABORATORY

## 2025-04-10 PROCEDURE — 3074F SYST BP LT 130 MM HG: CPT | Mod: ,,, | Performed by: FAMILY MEDICINE

## 2025-04-10 PROCEDURE — 84439 ASSAY OF FREE THYROXINE: CPT | Mod: ,,, | Performed by: CLINICAL MEDICAL LABORATORY

## 2025-04-10 PROCEDURE — 99214 OFFICE O/P EST MOD 30 MIN: CPT | Mod: ,,, | Performed by: FAMILY MEDICINE

## 2025-04-10 PROCEDURE — 3079F DIAST BP 80-89 MM HG: CPT | Mod: ,,, | Performed by: FAMILY MEDICINE

## 2025-04-10 PROCEDURE — 1160F RVW MEDS BY RX/DR IN RCRD: CPT | Mod: ,,, | Performed by: FAMILY MEDICINE

## 2025-04-10 PROCEDURE — 83036 HEMOGLOBIN GLYCOSYLATED A1C: CPT | Mod: ,,, | Performed by: CLINICAL MEDICAL LABORATORY

## 2025-04-10 PROCEDURE — 1159F MED LIST DOCD IN RCRD: CPT | Mod: ,,, | Performed by: FAMILY MEDICINE

## 2025-04-10 PROCEDURE — 3008F BODY MASS INDEX DOCD: CPT | Mod: ,,, | Performed by: FAMILY MEDICINE

## 2025-04-10 PROCEDURE — 81003 URINALYSIS AUTO W/O SCOPE: CPT | Mod: QW,,, | Performed by: CLINICAL MEDICAL LABORATORY

## 2025-04-10 RX ORDER — PROPRANOLOL HYDROCHLORIDE 10 MG/1
10 TABLET ORAL 2 TIMES DAILY
COMMUNITY
Start: 2025-03-20

## 2025-04-10 RX ORDER — SERTRALINE HYDROCHLORIDE 100 MG/1
100 TABLET, FILM COATED ORAL DAILY
COMMUNITY
Start: 2025-03-27

## 2025-04-10 RX ORDER — MIRTAZAPINE 15 MG/1
15 TABLET, FILM COATED ORAL NIGHTLY
COMMUNITY
Start: 2025-03-20

## 2025-04-10 NOTE — PROGRESS NOTES
"   Chemo Trimble MD    [address] [place] [address]     PATIENT NAME: Shruthi Mcdermott  : 1970  DATE: 4/10/25  MRN: [id number]      Billing Provider: Chemo Trimble MD  Level of Service: DC OFFICE/OUTPT VISIT, EST, LEVL IV, 30-39 MIN  Patient PCP Information       Provider PCP Type    Chemo Trimble MD General                   Update PCP  Update Chief Complaint         History of Present Illness / Problem Focused Workflow     Shruthi Mcdermott presents to the clinic    Chief Complaint   Patient presents with    Health Maintenance     TETANUS VACCINE Never done refuses  Pneumococcal Vaccines (Age 50+) refuses  Shingles Vaccine(1 of 2) Never done refuses  COVID-19 Vaccine(2024- season) refuses  Colorectal Cancer Screening due on 01/10/2025 refuses    Depression     [facility] in [place], pt sister says she has a call into [employer] pt had a "spell" yesterday.    Thyroid Problem     Follow up    Abdominal Pain     In her navel area. She states she had a scope a couple of years ago and they went through her navel.      HPI    Review of Systems     Review of Systems   Constitutional:  Negative for activity change, appetite change, chills, fatigue and fever.   HENT:  Negative for nasal congestion, ear pain, hearing loss, postnasal drip and sore throat.    Respiratory:  Negative for cough, chest tightness, shortness of breath and wheezing.    Cardiovascular:  Negative for chest pain, palpitations, leg swelling and claudication.   Gastrointestinal:  Negative for abdominal pain, change in bowel habit, constipation, diarrhea, nausea and vomiting.   Genitourinary:  Negative for dysuria.   Musculoskeletal:  Negative for arthralgias, back pain, gait problem and myalgias.   Integumentary:  Negative for rash.   Neurological:  Negative for weakness and headaches.   Psychiatric/Behavioral:  Negative for suicidal ideas. The patient is not nervous/anxious.     "     Medical / Social / Family History     Past Medical History:   Diagnosis Date    Acute superficial gastritis without hemorrhage 12/20/2021    Adenomatous polyp of ascending colon 01/10/2022    Adenomatous polyp of descending colon 01/10/2022    Adenomatous polyp of sigmoid colon 01/10/2022    Chronic headaches     Depression     Diverticula, colon 01/10/2022    Mental impairment     Schizophrenia     Screening for malignant neoplasm of colon 01/10/2022    Urinary retention 07/12/2021       Past Surgical History:   Procedure Laterality Date    ANKLE SURGERY Right     CHOLECYSTECTOMY      DIAGNOSTIC LAPAROSCOPY      HYSTERECTOMY      OOPHORECTOMY      REDUCTION OF BOTH BREASTS      TOTAL REDUCTION MAMMOPLASTY         Social History  Ms.  reports that she has never smoked. She has never been exposed to tobacco smoke. She has never used smokeless tobacco. She reports that she does not drink alcohol and does not use drugs.    Family History  Ms.'s family history includes Heart disease in her mother; Hypertension in her father.    Medications and Allergies     Medications  Outpatient Medications Marked as Taking for the 4/10/25 encounter (Office Visit) with Chemo Trimble MD   Medication Sig Dispense Refill    benztropine (COGENTIN) 1 MG tablet Take 1 tablet by mouth 2 (two) times daily. 180 tablet 1    DULoxetine (CYMBALTA) 30 MG capsule Take 90 mg by mouth.      ergocalciferol (ERGOCALCIFEROL) 50,000 unit Cap Take 1 capsule (50,000 Units total) by mouth every 7 days. 4 capsule 1    hydrOXYzine (ATARAX) 50 MG tablet Take 1 tablet (50 mg total) by mouth 3 (three) times daily as needed for Anxiety. 90 tablet 1    levothyroxine (SYNTHROID) 100 MCG tablet Take 1 tablet (100 mcg total) by mouth before breakfast. 90 tablet 1    mirtazapine (REMERON) 15 MG tablet Take 15 mg by mouth every evening.      OLANZapine (ZYPREXA) 10 MG tablet Take 10 mg by mouth every evening.      propranoloL (INDERAL) 10 MG tablet Take 10  mg by mouth 2 (two) times daily.      sertraline (ZOLOFT) 100 MG tablet Take 100 mg by mouth once daily.         Allergies  Review of patient's allergies indicates:  No Known Allergies    Physical Examination     Vitals:    04/10/25 1347   BP: 123/86   Pulse: 83   Resp: 16   Temp: 98.2 °F (36.8 °C)     Physical Exam  Constitutional:       Appearance: Normal appearance.   HENT:      Head: Normocephalic and atraumatic.   Eyes:      Extraocular Movements: Extraocular movements intact.   Cardiovascular:      Rate and Rhythm: Normal rate and regular rhythm.   Pulmonary:      Effort: Pulmonary effort is normal.      Breath sounds: Normal breath sounds.   Abdominal:      Tenderness: There is abdominal tenderness in the left lower quadrant. There is no guarding or rebound.       Skin:     General: Skin is warm.   Neurological:      Mental Status: She is alert and oriented to person, place, and time. Mental status is at baseline.            Assessment and Plan (including Health Maintenance)      Problem List  Smart Sets  Document Outside HM   :    Plan:     Left lower quadrant abdominal pain - US abdomen, left lower quadrant     Mood is managed by [facility]     I have reviewed the chronic health problems listed below. They are stable at this time and no changes are being made, other than what is listed above      Health Maintenance Due   Topic Date Due    Diabetes Urine Screening  Never done    Foot Exam  Never done    Diabetic Eye Exam  Never done    TETANUS VACCINE  Never done    Pneumococcal Vaccines (Age 50+) (1 of 2 - PCV) Never done    Low Dose Statin  Never done    Shingles Vaccine (1 of 2) Never done    COVID-19 Vaccine (1 - 2024-25 season) Never done    Colorectal Cancer Screening  01/10/2025       Problem List Items Addressed This Visit          Endocrine    Acquired hypothyroidism (Chronic)    Relevant Orders    TSH (Completed)    T4, Free (Completed)    Type 2 diabetes mellitus with hyperglycemia, without long-term  current use of insulin (Chronic)     Other Visit Diagnoses         Left lower quadrant abdominal pain    -  Primary    Relevant Orders    US Abdomen Complete            Health Maintenance Topics with due status: Not Due       Topic Last Completion Date    Lipid Panel 10/03/2024    Mammogram 03/19/2025    Hemoglobin A1c 04/10/2025    RSV Vaccine (Age 60+ and Pregnant patients) Not Due       Procedures     Future Appointments   Date Time Provider Department Center   5/15/2025  8:30 AM [facility]OM 02 [facility]   3/25/2026  1:00 PM [facility]        Follow up in about 6 months (around 10/10/2025), or if symptoms worsen or fail to improve, for chronic health problems.     Signature:  Chemo Trimble MD  [address] [place] [address]  Phone #: [phone]  Fax #: [phone]    Date of encounter: 4/10/25    There are no Patient Instructions on file for this visit.

## 2025-04-17 ENCOUNTER — RESULTS FOLLOW-UP (OUTPATIENT)
Dept: FAMILY MEDICINE | Facility: CLINIC | Age: 55
End: 2025-04-17

## 2025-04-17 ENCOUNTER — HOSPITAL ENCOUNTER (OUTPATIENT)
Dept: RADIOLOGY | Facility: HOSPITAL | Age: 55
Discharge: HOME | End: 2025-04-17
Attending: FAMILY MEDICINE
Payer: MEDICARE

## 2025-04-17 DIAGNOSIS — E11.65 TYPE 2 DIABETES MELLITUS WITH HYPERGLYCEMIA, WITHOUT LONG-TERM CURRENT USE OF INSULIN: Primary | Chronic | ICD-10-CM

## 2025-04-17 DIAGNOSIS — R10.32 LEFT LOWER QUADRANT ABDOMINAL PAIN: ICD-10-CM

## 2025-04-17 PROCEDURE — 76700 US EXAM ABDOM COMPLETE: CPT | Mod: TC

## 2025-04-17 PROCEDURE — 76700 US EXAM ABDOM COMPLETE: CPT | Mod: 26,,, | Performed by: RADIOLOGY

## 2025-04-17 RX ORDER — METFORMIN HYDROCHLORIDE 500 MG/1
500 TABLET, EXTENDED RELEASE ORAL
Qty: 90 TABLET | Refills: 1 | Status: SHIPPED | OUTPATIENT
Start: 2025-04-17 | End: 2026-04-17

## 2025-04-17 NOTE — TELEPHONE ENCOUNTER
----- Message from Chemo Trimble MD sent at 4/17/2025  8:05 AM CDT -----  Diabetes is worse since her last labs. Time to start metformin 500mg ER once daily     Other labs acceptable   ----- Message -----  From: Lab, Background User  Sent: 4/10/2025   8:28 PM CDT  To: Chemo Trimble MD

## 2025-05-01 ENCOUNTER — TELEPHONE (OUTPATIENT)
Dept: GASTROENTEROLOGY | Facility: CLINIC | Age: 55
End: 2025-05-01
Payer: MEDICARE

## 2025-05-15 ENCOUNTER — HOSPITAL ENCOUNTER (OUTPATIENT)
Dept: GASTROENTEROLOGY | Facility: HOSPITAL | Age: 55
Discharge: HOME | End: 2025-05-15
Attending: FAMILY MEDICINE
Payer: MEDICARE

## 2025-05-15 DIAGNOSIS — Z12.11 SCREENING FOR COLON CANCER: ICD-10-CM

## 2025-05-15 NOTE — H&P
"[facility]  Gastroenterology  H&P    Patient Name: Shruthi Mcdermott  MRN: [id number]  Admission Date: 5/15/2025  Code Status: Prior    Attending Provider: Chemo Trimble MD   Primary Care Physician: Chemo Trimble MD  Principal Problem:<principal problem not specified>    Subjective:     History of Present Illness:  This patient presented for colonoscopy, but she was inadequately prepped.    Past Medical History:   Diagnosis Date    Acute superficial gastritis without hemorrhage 12/20/2021    Adenomatous polyp of ascending colon 01/10/2022    Adenomatous polyp of descending colon 01/10/2022    Adenomatous polyp of sigmoid colon 01/10/2022    Chronic headaches     Depression     Diverticula, colon 01/10/2022    Mental impairment     Schizophrenia     Screening for malignant neoplasm of colon 01/10/2022    Urinary retention 07/12/2021       Past Surgical History:   Procedure Laterality Date    ANKLE SURGERY Right     CHOLECYSTECTOMY      DIAGNOSTIC LAPAROSCOPY      HYSTERECTOMY      OOPHORECTOMY      REDUCTION OF BOTH BREASTS      TOTAL REDUCTION MAMMOPLASTY         Review of patient's allergies indicates:  No Known Allergies  Family History       Problem Relation (Age of Onset)    Heart disease Mother    Hypertension Father          Tobacco Use    Smoking status: Never     Passive exposure: Never    Smokeless tobacco: Never   Substance and Sexual Activity    Alcohol use: Never    Drug use: Never    Sexual activity: Not Currently     Review of Systems  Objective:     Vital Signs (Most Recent):    Vital Signs (24h Range):           There is no height or weight on file to calculate BMI.    No intake or output data in the 24 hours ending 05/15/25 0911    Lines/Drains/Airways       None                   Physical Exam    Significant Labs:  CBC: No results for input(s): "WBC", "HGB", "HCT", "PLT" in the last 48 hours.  CMP: No results for input(s): "GLU", "CALCIUM", "ALBUMIN", "PROT", "NA", "K", " ""CO2", "CL", "BUN", "CREATININE", "ALKPHOS", "ALT", "AST", "BILITOT" in the last 48 hours.    Significant Imaging:  Imaging results within the past 24 hours have been reviewed.    Assessment/Plan:     There are no hospital problems to display for this patient.        Impression: History of colon polyps, inadequate bowel prep  Plan: Reschedule the patient for a colonoscopy with a 2 day bowel prep.    Rodríguez Ochoa MD  Gastroenterology  [facility]  "

## 2025-05-19 LAB
LEFT EYE DM RETINOPATHY: NEGATIVE
RIGHT EYE DM RETINOPATHY: NEGATIVE

## 2025-06-13 ENCOUNTER — PATIENT OUTREACH (OUTPATIENT)
Facility: HOSPITAL | Age: 55
End: 2025-06-13
Payer: MEDICARE

## 2025-06-13 NOTE — PROGRESS NOTES
Population Health Chart Review & Patient Outreach Details    Updates Requested / Reviewed:  [x]  Care Team Updated    Health Maintenance Topics Addressed and Outreach Outcomes / Actions Taken:  Diabetic Eye Exam [x] HM Updated with May 2025 Eye Exam (Dr. Brown) abstracted from [employer] Member Summary. KALYAN sent to [facility] for report. History Updated.

## 2025-06-13 NOTE — LETTER
AUTHORIZATION FOR RELEASE OF   CONFIDENTIAL INFORMATION    Dear [facility],    We are seeing Shruthi Mcdermott, date of birth 1970, in the clinic at [facility]. Chemo Trimble MD is the patient's PCP. Shruthi Mcdermott has an outstanding lab/procedure at the time we reviewed her chart. In order to help keep her health information updated, she has authorized us to request the following medical record(s):        (  )  MAMMOGRAM                                      (  )  COLONOSCOPY      (  )  PAP SMEAR                                          (  )  OUTSIDE LAB RESULTS     (  )  DEXA SCAN                                          ( x )  EYE EXAM            (  )  FOOT EXAM                                          (  )  ENTIRE RECORD     (  )  OUTSIDE IMMUNIZATIONS                 (  )  _______________         Please fax records to Elgin Hoang LPN Care Coordinator at [phone].      If you have any questions, please contact Elgin at [phone].           Patient Name: Shruthi Mcdermott  : 1970  Patient Phone #: [phone]                Shruthi Mcdermott  MRN: [id number]  : 1970  Age: 54 y.o.  Sex: female         Patient/Legal Guardian Signature  This signature was collected at 2025    Shruthi Mcdermott     Self  _______________________________   Printed Name/Relationship to Patient      Consent for Examination and Treatment: I hereby authorize the providers and employees of [facility] ([facility]) to provide medical treatment/services which includes, but is not limited to, performing and administering tests and diagnostic procedures that are deemed necessary, including, but not limited to, imaging examinations, blood tests and other laboratory procedures as may be required by the hospital, clinic, or may be ordered by my physician(s) or persons working under the general and/or special instructions of my physician(s).      I understand  and agree that this consent covers all authorized persons, including but not limited to physicians, residents, nurse practitioners, physicians' assistants, specialists, consultants, student nurses, and independently contracted physicians, who are called upon by the physician in charge, to carry out the diagnostic procedures and medical or surgical treatment.     I hereby authorize [facility] to retain or dispose of any specimens or tissue, should there be such remaining from any test or procedure.     I hereby authorize and give consent for [facility] providers and employees to take photographs, images or videotapes of such diagnostic, surgical or treatment procedures of Patient as may be required by [facility] or as may be ordered by a physician. I further acknowledge and agree that [facility] may use cameras or other devices for patient monitoring.     I am aware that the practice of medicine is not an exact science, and I acknowledge that no guarantees have been made to me as to the outcome of any tests, procedures or treatment.     Authorization for Release of Information: I understand that my insurance company and/or their agents may need information necessary to make determinations about payment/reimbursement. I hereby provide authorization to release to all insurance companies, their successors, assignees, other parties with whom they may have contracted, or others acting on their behalf, that are involved with payment for any hospital and/or clinic charges incurred by the patient, any information that they request and deem necessary for payment/reimbursement, and/or quality review.  I further authorize the release of my health information to physicians or other health care practitioners on staff who are involved in my health care now and in the future, and to other health care providers, entities, or institutions for the purpose of my continued care and treatment, including referrals.     REGISTRATION  AUTHORIZATION  Form No. 65778 (Rev. 3/25/2024)    Page 1 of 3                       Medicare Patient's Certification and Authorization to Release Information and Payment Request:  I certify that the information given by me in applying for payment under Title XVIII of the Social Security Act is correct. I authorize any martinez of medical or other information about me to release to the Social SecurityAdministration, or its intermediaries or carriers, any information needed for this or a related Medicare claim. I request that payment of authorized benefits be made on my behalf.     Assignment of Insurance Benefits:   I hereby authorize any and all insurance companies, health plans, defined   benefit plans, health insurers or any entity that is or may be responsible for payment of my medical expenses to pay all hospital and medical benefits now due, and to become due and payable to me under any hospital benefits, sick benefits, injury benefits or any other benefit for services rendered to me, including Major Medical Benefits, direct to [facility] and all independently contracted physicians. I assign any and all rights that I may have against any and all insurance companies, health plans, defined benefit plans, health insurers or any entity that is or may be responsible for payment of my medical expenses, including, but not limited to any right to appeal a denial of a claim, any right to bring any action, lawsuit, administrative proceeding, or other cause of action on my behalf. I specifically assign my right to pursue litigation against any and all insurance companies, health plans, defined benefit plans, health insurers or any entity that is or may be responsible for payment of my medical expenses based upon a refusal to pay charges.            E. Valuables: It is understood and agreed that [facility] is not liable for the damage to or loss of any money, jewelry,   documents, dentures, eye glasses, hearing aids, prosthetics,  or other property of value.     F. Computer Equipment: I understand and agree that should I choose to use computer equipment owned by [facility] or if I choose to access the Internet via [facility]s network, I do so at my own risk. [facility] is not responsible for any damage to my computer equipment or to any damages of any type that might arise from my loss of equipment or data.     G. Acceptance of Financial Responsibility:  I agree that in consideration of the services and   supplies that have been   or will be furnished to the patient, I am hereby obligated to pay all charges made for or on the account of the patient according to the standard rates (in effect at the time the services and supplies are delivered) established by [facility], including its Patient Financial Assistance Policy to the extent it is applicable. I understand that I am responsible for all charges, or portions thereof, not covered by insurance or other sources. Patient refunds will be distributed only after balances at all [facility] facilities are paid.     H. Communication Authorization:  I hereby authorize [facility] and its representatives, along with any billing service   or  who may work on their behalf, to contact me on   my cell phone and/or home phone using pre- recorded messages, artificial voice messages, automatic telephone dialing devices or other computer assisted technology, or by electronic      mail, text messaging, or by any other form of electronic communication. This includes, but is not limited to, appointment reminders, yearly physical exam reminders, preventive care reminders, patient campaigns, welcome calls, and calls about account balances on my account or any account on which I am listed as a guarantor. I understand I have the right to opt out of these communications at any time.      Relationship  Between  Facility and  Provider:      I understand that some, but not all, providers furnishing services to the  patient are not employees or agents of [facility]. The patient is under the care and supervision of his/her attending physician, and it is the responsibility of the facility and its nursing staff to carry out the instructions of such physicians. It is the responsibility of the patient's physician/designee to obtain the patient's informed consent, when required, for medical or surgical treatment, special diagnostic or therapeutic procedures, or hospital services rendered for the patient under the special instructions of the physician/designee.           REGISTRATION AUTHORIZATION  Form No. 27038 (Rev. 3/25/2024)    Page 2 of 3                       Immunizations: [facility] shares immunization information with state sponsored health departments to help you and your doctor keep track of your immunization records. By signing, you consent to have this information shared with the health department in your state:                                [place] - LINKS ([place] Immunization Network for Kids Statewide)                                [place] - MIIX ([place] Immunization Information eXchange)                                [place] - ImmPRINT (Immunization Patient Registry with Integrated Technology)     TERM: This authorization is valid for this and subsequent care/treatment I receive at [facility] and will remain valid unless/until revoked in writing by me.     [facility]: As used in this document, [facility] means all [facility] owned and managed facilities, including, but not limited to, all health centers, surgery centers, clinics, urgent care centers, and hospitals.         [facility] complies with applicable Federal civil rights laws and does not discriminate on the basis of race, color, national origin, age, disability, or sex.  ATENCIÓN: si mattla joselito, tiene a joiner disposición servicios gratuitos de asistencia lingüística. Gus quinn [phone].  [facility] Ý: N?u b?n nói Ti?ng Vi?t, có  các d?ch v? h? tr? ngôn ng? mi?n phí dành cho b?n. G?i s? [phone].        REGISTRATION AUTHORIZATION  Form No. 10925 (Rev. 3/25/2024)   Page 3 of 3

## 2025-07-14 ENCOUNTER — OFFICE VISIT (OUTPATIENT)
Dept: FAMILY MEDICINE | Facility: CLINIC | Age: 55
End: 2025-07-14
Payer: MEDICARE

## 2025-07-14 VITALS
HEIGHT: 64 IN | SYSTOLIC BLOOD PRESSURE: 126 MMHG | RESPIRATION RATE: 16 BRPM | HEART RATE: 99 BPM | OXYGEN SATURATION: 98 % | BODY MASS INDEX: 29.84 KG/M2 | WEIGHT: 174.81 LBS | DIASTOLIC BLOOD PRESSURE: 81 MMHG | TEMPERATURE: 98 F

## 2025-07-14 DIAGNOSIS — E11.65 TYPE 2 DIABETES MELLITUS WITH HYPERGLYCEMIA, WITHOUT LONG-TERM CURRENT USE OF INSULIN: Chronic | ICD-10-CM

## 2025-07-14 DIAGNOSIS — F20.81 SCHIZOPHRENIFORM DISORDER: Primary | Chronic | ICD-10-CM

## 2025-07-14 DIAGNOSIS — E03.9 ACQUIRED HYPOTHYROIDISM: Chronic | ICD-10-CM

## 2025-07-14 LAB
CREAT UR-MCNC: 183 MG/DL (ref 15–325)
EST. AVERAGE GLUCOSE BLD GHB EST-MCNC: 148 MG/DL
HBA1C MFR BLD HPLC: 6.8 %
MICROALBUMIN UR-MCNC: 0.8 MG/DL
MICROALBUMIN/CREAT RATIO PNL UR: 4.4 MG/G (ref 0–30)

## 2025-07-14 PROCEDURE — 82570 ASSAY OF URINE CREATININE: CPT | Mod: ,,, | Performed by: CLINICAL MEDICAL LABORATORY

## 2025-07-14 PROCEDURE — 3074F SYST BP LT 130 MM HG: CPT | Mod: ,,, | Performed by: FAMILY MEDICINE

## 2025-07-14 PROCEDURE — 83036 HEMOGLOBIN GLYCOSYLATED A1C: CPT | Mod: ,,, | Performed by: CLINICAL MEDICAL LABORATORY

## 2025-07-14 PROCEDURE — 3044F HG A1C LEVEL LT 7.0%: CPT | Mod: ,,, | Performed by: FAMILY MEDICINE

## 2025-07-14 PROCEDURE — 1159F MED LIST DOCD IN RCRD: CPT | Mod: ,,, | Performed by: FAMILY MEDICINE

## 2025-07-14 PROCEDURE — 3079F DIAST BP 80-89 MM HG: CPT | Mod: ,,, | Performed by: FAMILY MEDICINE

## 2025-07-14 PROCEDURE — 3008F BODY MASS INDEX DOCD: CPT | Mod: ,,, | Performed by: FAMILY MEDICINE

## 2025-07-14 PROCEDURE — 99214 OFFICE O/P EST MOD 30 MIN: CPT | Mod: ,,, | Performed by: FAMILY MEDICINE

## 2025-07-14 PROCEDURE — 82043 UR ALBUMIN QUANTITATIVE: CPT | Mod: ,,, | Performed by: CLINICAL MEDICAL LABORATORY

## 2025-07-14 PROCEDURE — 1160F RVW MEDS BY RX/DR IN RCRD: CPT | Mod: ,,, | Performed by: FAMILY MEDICINE

## 2025-07-14 PROCEDURE — 3061F NEG MICROALBUMINURIA REV: CPT | Mod: ,,, | Performed by: FAMILY MEDICINE

## 2025-07-14 PROCEDURE — 2023F DILAT RTA XM W/O RTNOPTHY: CPT | Mod: ,,, | Performed by: FAMILY MEDICINE

## 2025-07-14 PROCEDURE — 3066F NEPHROPATHY DOC TX: CPT | Mod: ,,, | Performed by: FAMILY MEDICINE

## 2025-07-14 RX ORDER — LEVOTHYROXINE SODIUM 100 UG/1
100 TABLET ORAL
Qty: 90 TABLET | Refills: 1 | Status: SHIPPED | OUTPATIENT
Start: 2025-07-14

## 2025-07-14 RX ORDER — LINACLOTIDE 72 UG/1
72 CAPSULE, GELATIN COATED ORAL EVERY MORNING
COMMUNITY
Start: 2025-06-30

## 2025-07-14 RX ORDER — BENZTROPINE MESYLATE 1 MG/1
1 TABLET ORAL 2 TIMES DAILY
Qty: 180 TABLET | Refills: 1 | Status: SHIPPED | OUTPATIENT
Start: 2025-07-14

## 2025-07-14 RX ORDER — OXCARBAZEPINE 150 MG/1
150 TABLET, FILM COATED ORAL 2 TIMES DAILY
COMMUNITY
Start: 2025-07-03

## 2025-07-14 RX ORDER — LUMATEPERONE 42 MG/1
42 CAPSULE ORAL DAILY
COMMUNITY

## 2025-07-14 RX ORDER — METFORMIN HYDROCHLORIDE 500 MG/1
500 TABLET, EXTENDED RELEASE ORAL
Qty: 90 TABLET | Refills: 1 | Status: SHIPPED | OUTPATIENT
Start: 2025-07-14 | End: 2026-07-14

## 2025-07-14 NOTE — PROGRESS NOTES
Chemo Trimble MD    [address] [place] [address]     PATIENT NAME: Shruthi Mcdermott  : 1970  DATE: 25  MRN: [id number]      Billing Provider: Chemo Trimble MD  Level of Service: HI OFFICE/OUTPT VISIT, EST, LEVL IV, 30-39 MIN  Patient PCP Information       Provider PCP Type    Chemo Trimble MD General                   Update PCP  Update Chief Complaint         History of Present Illness / Problem Focused Workflow     Shruthi Mcdermott presents to the clinic with   Chief Complaint   Patient presents with    Constipation    Follow-up     Hospital follow up from [facility]. May 30 -.    Health Maintenance     Diabetes Urine Screening Never done  Foot Exam Never done  TETANUS VACCINE Never done refuses  Pneumococcal Vaccines (Age 50+) refuses  Low Dose Statin Never done refuses  Shingles Vaccine(1 of 2) Never done refuses  COVID-19 Vaccine( season) refuses  Colorectal Cancer Screening due on 01/10/2025 refuses      HPI    Review of Systems     Review of Systems   Constitutional:  Negative for activity change, appetite change, chills, fatigue and fever.   HENT:  Negative for nasal congestion, ear pain, hearing loss, postnasal drip and sore throat.    Respiratory:  Negative for cough, chest tightness, shortness of breath and wheezing.    Cardiovascular:  Negative for chest pain, palpitations, leg swelling and claudication.   Gastrointestinal:  Negative for abdominal pain, change in bowel habit, constipation, diarrhea, nausea and vomiting.   Genitourinary:  Negative for dysuria.   Musculoskeletal:  Negative for arthralgias, back pain, gait problem and myalgias.   Integumentary:  Negative for rash.   Neurological:  Negative for weakness and headaches.   Psychiatric/Behavioral:  Negative for suicidal ideas. The patient is not nervous/anxious.         Medical / Social / Family History     Past Medical History:    Diagnosis Date    Acute superficial gastritis without hemorrhage 12/20/2021    Adenomatous polyp of ascending colon 01/10/2022    Adenomatous polyp of descending colon 01/10/2022    Adenomatous polyp of sigmoid colon 01/10/2022    Chronic headaches     Depression     Diabetic eye exam 05/19/2025    Dr. De Brown - [facility]    Diverticula, colon 01/10/2022    Mental impairment     Schizophrenia     Screening for malignant neoplasm of colon 01/10/2022    Urinary retention 07/12/2021       Past Surgical History:   Procedure Laterality Date    ANKLE SURGERY Right     CHOLECYSTECTOMY      DIAGNOSTIC LAPAROSCOPY      HYSTERECTOMY      OOPHORECTOMY      REDUCTION OF BOTH BREASTS      TOTAL REDUCTION MAMMOPLASTY         Social History  Ms.  reports that she has never smoked. She has never been exposed to tobacco smoke. She has never used smokeless tobacco. She reports that she does not drink alcohol and does not use drugs.    Family History  Ms.'s family history includes Heart disease in her mother; Hypertension in her father.    Medications and Allergies     Medications  Outpatient Medications Marked as Taking for the 7/14/25 encounter (Office Visit) with Chemo Trimble MD   Medication Sig Dispense Refill    DULoxetine (CYMBALTA) 30 MG capsule Take 90 mg by mouth.      ergocalciferol (ERGOCALCIFEROL) 50,000 unit Cap Take 1 capsule (50,000 Units total) by mouth every 7 days. 4 capsule 1    hydrOXYzine (ATARAX) 50 MG tablet Take 1 tablet (50 mg total) by mouth 3 (three) times daily as needed for Anxiety. 90 tablet 1    LINZESS 72 mcg Cap capsule Take 72 mcg by mouth every morning.      lumateperone (CAPLYTA) 42 mg Cap Take 42 mg by mouth once daily.      mirtazapine (REMERON) 15 MG tablet Take 15 mg by mouth every evening.      OXcarbazepine (TRILEPTAL) 150 MG Tab Take 150 mg by mouth 2 (two) times daily.      [DISCONTINUED] benztropine (COGENTIN) 1 MG tablet Take 1 tablet by mouth 2 (two) times  daily. 180 tablet 1    [DISCONTINUED] levothyroxine (SYNTHROID) 100 MCG tablet Take 1 tablet (100 mcg total) by mouth before breakfast. 90 tablet 1    [DISCONTINUED] metFORMIN (GLUCOPHAGE-XR) 500 MG ER 24hr tablet Take 1 tablet (500 mg total) by mouth daily with breakfast. 90 tablet 1       Allergies  Review of patient's allergies indicates:  No Known Allergies    Physical Examination     Vitals:    07/14/25 0818   BP: 126/81   Pulse: 99   Resp: 16   Temp: 97.7 °F (36.5 °C)     Physical Exam  Constitutional:       Appearance: Normal appearance.   HENT:      Head: Normocephalic and atraumatic.   Eyes:      Extraocular Movements: Extraocular movements intact.   Cardiovascular:      Rate and Rhythm: Normal rate and regular rhythm.   Pulmonary:      Effort: Pulmonary effort is normal.      Breath sounds: Normal breath sounds.   Skin:     General: Skin is warm.   Neurological:      Mental Status: She is alert and oriented to person, place, and time. Mental status is at baseline.            Assessment and Plan (including Health Maintenance)      Problem List  Smart Sets  Document Outside HM   :    Plan:     She is doing fairly well since discharge from inpatient care.     Health Maintenance Due   Topic Date Due    Foot Exam  Never done    TETANUS VACCINE  Never done    Pneumococcal Vaccines (Age 50+) (1 of 2 - PCV) Never done    Low Dose Statin  Never done    Shingles Vaccine (1 of 2) Never done    COVID-19 Vaccine (1 - 2024-25 season) Never done    Colorectal Cancer Screening  01/10/2025       Problem List Items Addressed This Visit          Psychiatric    Schizophrenia - Primary (Chronic)    Relevant Medications    benztropine (COGENTIN) 1 MG tablet       Endocrine    Acquired hypothyroidism (Chronic)    Relevant Medications    levothyroxine (SYNTHROID) 100 MCG tablet    Type 2 diabetes mellitus with hyperglycemia, without long-term current use of insulin (Chronic)    Current Assessment & Plan    - Check glucose outside  of clinic, record numbers, and bring log to follow up visit.    - Take medications as directed, and bring all medications to every clinic appointment.    - Eat a diabetic diet, if there are concerns about what that entails, we have a diabetic educator in our clinic.    - Cardiovascular exercise at least 3 times per week, for at least 15 minutes.    - Patient should be on a Statin medication, unless patient cannot tolerate a Statin.     - Recommend yearly, dilated eye exams for all Diabetic Patients.    - Monitor feet for calluses, abrasion, or other abnormalities. Report any concerns at every clinic visit.    -   Hemoglobin A1C   Date Value Ref Range Status   04/10/2025 7.3 (H) <=7.0 % Final     Comment:       Normal:               <5.7%  Pre-Diabetic:       5.7% to 6.4%  Diabetic:             >6.4%  Diabetic Goal:     <7%   09/09/2024 6.2 4.5 - 6.6 % Final     Comment:       Normal:               <5.7%  Pre-Diabetic:       5.7% to 6.4%  Diabetic:             >6.4%  Diabetic Goal:     <7%   12/01/2022 6.2 4.5 - 6.6 % Final     Comment:       Normal:               <5.7%  Pre-Diabetic:       5.7% to 6.4%  Diabetic:             >6.4%  Diabetic Goal:     <7%     Hemoglobin A1c   Date Value Ref Range Status   10/03/2024 6.1 (H) 4.0 - 5.6 % Final     Comment:     ADA Screening Guidelines:  5.7-6.4%  Consistent with prediabetes  >=6.5%  Consistent with diabetes    High levels of fetal hemoglobin interfere with the HbA1C  assay. Heterozygous hemoglobin variants (HbS, HgC, etc)do  not significantly interfere with this assay.   However, presence of multiple variants may affect accuracy.               Relevant Medications    metFORMIN (GLUCOPHAGE-XR) 500 MG ER 24hr tablet    Other Relevant Orders    Hemoglobin A1C (Completed)    Microalbumin/Creatinine Ratio, Urine (Completed)       Health Maintenance Topics with due status: Not Due       Topic Last Completion Date    Lipid Panel 10/03/2024    Influenza Vaccine 12/09/2024     Mammogram 03/19/2025    Diabetic Eye Exam 05/19/2025    Diabetes Urine Screening 07/14/2025    Hemoglobin A1c 07/14/2025    RSV Vaccine (Age 60+ and Pregnant patients) Not Due       Procedures     Future Appointments   Date Time Provider Department Center   9/11/2025  7:00 AM [facility] GI ROOM 02 [facility]   12/16/2025  3:30 PM Ama Vallecillo, [facility]   3/25/2026  1:00 PM [facility] MAM[facility]2 [facility] [place]        Follow up in about 3 months (around 10/14/2025), or if symptoms worsen or fail to improve, for chronic health problems, diabetes, hypertension.     Signature:  Chemo Trimble MD  [address] [place] [address]  Phone #: [phone]  Fax #: [phone]    Date of encounter: 7/14/25    There are no Patient Instructions on file for this visit.

## 2025-07-14 NOTE — ASSESSMENT & PLAN NOTE
- Check glucose outside of clinic, record numbers, and bring log to follow up visit.    - Take medications as directed, and bring all medications to every clinic appointment.    - Eat a diabetic diet, if there are concerns about what that entails, we have a diabetic educator in our clinic.    - Cardiovascular exercise at least 3 times per week, for at least 15 minutes.    - Patient should be on a Statin medication, unless patient cannot tolerate a Statin.     - Recommend yearly, dilated eye exams for all Diabetic Patients.    - Monitor feet for calluses, abrasion, or other abnormalities. Report any concerns at every clinic visit.    -   Hemoglobin A1C   Date Value Ref Range Status   04/10/2025 7.3 (H) <=7.0 % Final     Comment:       Normal:               <5.7%  Pre-Diabetic:       5.7% to 6.4%  Diabetic:             >6.4%  Diabetic Goal:     <7%   09/09/2024 6.2 4.5 - 6.6 % Final     Comment:       Normal:               <5.7%  Pre-Diabetic:       5.7% to 6.4%  Diabetic:             >6.4%  Diabetic Goal:     <7%   12/01/2022 6.2 4.5 - 6.6 % Final     Comment:       Normal:               <5.7%  Pre-Diabetic:       5.7% to 6.4%  Diabetic:             >6.4%  Diabetic Goal:     <7%     Hemoglobin A1c   Date Value Ref Range Status   10/03/2024 6.1 (H) 4.0 - 5.6 % Final     Comment:     ADA Screening Guidelines:  5.7-6.4%  Consistent with prediabetes  >=6.5%  Consistent with diabetes    High levels of fetal hemoglobin interfere with the HbA1C  assay. Heterozygous hemoglobin variants (HbS, HgC, etc)do  not significantly interfere with this assay.   However, presence of multiple variants may affect accuracy.